# Patient Record
Sex: MALE | Race: WHITE | NOT HISPANIC OR LATINO | Employment: FULL TIME | URBAN - METROPOLITAN AREA
[De-identification: names, ages, dates, MRNs, and addresses within clinical notes are randomized per-mention and may not be internally consistent; named-entity substitution may affect disease eponyms.]

---

## 2023-08-18 ENCOUNTER — TELEPHONE (OUTPATIENT)
Dept: PRIMARY CARE | Facility: CLINIC | Age: 63
End: 2023-08-18

## 2023-08-23 DIAGNOSIS — F90.2 ATTENTION DEFICIT HYPERACTIVITY DISORDER (ADHD), COMBINED TYPE: Primary | ICD-10-CM

## 2023-08-23 RX ORDER — DEXTROAMPHETAMINE SACCHARATE, AMPHETAMINE ASPARTATE, DEXTROAMPHETAMINE SULFATE AND AMPHETAMINE SULFATE 5; 5; 5; 5 MG/1; MG/1; MG/1; MG/1
20 TABLET ORAL 3 TIMES DAILY
Qty: 90 TABLET | Refills: 0 | Status: SHIPPED | OUTPATIENT
Start: 2023-08-23 | End: 2023-08-29 | Stop reason: SDUPTHER

## 2023-08-29 ENCOUNTER — OFFICE VISIT (OUTPATIENT)
Dept: PRIMARY CARE | Facility: CLINIC | Age: 63
End: 2023-08-29
Payer: COMMERCIAL

## 2023-08-29 VITALS
TEMPERATURE: 98 F | WEIGHT: 193.6 LBS | SYSTOLIC BLOOD PRESSURE: 120 MMHG | BODY MASS INDEX: 28.18 KG/M2 | DIASTOLIC BLOOD PRESSURE: 80 MMHG

## 2023-08-29 DIAGNOSIS — F90.9 ATTENTION DEFICIT HYPERACTIVITY DISORDER (ADHD), UNSPECIFIED ADHD TYPE: ICD-10-CM

## 2023-08-29 PROBLEM — R39.11 URINARY HESITANCY: Status: ACTIVE | Noted: 2023-08-29

## 2023-08-29 PROBLEM — I25.10 CORONARY ARTERIOSCLEROSIS: Status: ACTIVE | Noted: 2023-08-29

## 2023-08-29 PROBLEM — E78.49 FAMILIAL COMBINED HYPERLIPIDEMIA: Status: ACTIVE | Noted: 2023-08-29

## 2023-08-29 PROBLEM — F98.8 ATTENTION DEFICIT DISORDER (ADD): Status: ACTIVE | Noted: 2023-08-29

## 2023-08-29 PROBLEM — R73.01 ELEVATED FASTING BLOOD SUGAR: Status: ACTIVE | Noted: 2023-08-29

## 2023-08-29 LAB
AMPHETAMINE (PRESENCE) IN URINE BY SCREEN METHOD: ABNORMAL
BARBITURATES PRESENCE IN URINE BY SCREEN METHOD: ABNORMAL
BENZODIAZEPINE (PRESENCE) IN URINE BY SCREEN METHOD: ABNORMAL
CANNABINOIDS IN URINE BY SCREEN METHOD: ABNORMAL
COCAINE (PRESENCE) IN URINE BY SCREEN METHOD: ABNORMAL
DRUG SCREEN COMMENT URINE: ABNORMAL
FENTANYL URINE: ABNORMAL
OPIATES (PRESENCE) IN URINE BY SCREEN METHOD: ABNORMAL
OXYCODONE (PRESENCE) IN URINE BY SCREEN METHOD: ABNORMAL
PHENCYCLIDINE (PRESENCE) IN URINE BY SCREEN METHOD: ABNORMAL

## 2023-08-29 PROCEDURE — 90471 IMMUNIZATION ADMIN: CPT | Performed by: NURSE PRACTITIONER

## 2023-08-29 PROCEDURE — 80324 DRUG SCREEN AMPHETAMINES 1/2: CPT

## 2023-08-29 PROCEDURE — 80307 DRUG TEST PRSMV CHEM ANLYZR: CPT

## 2023-08-29 PROCEDURE — 1160F RVW MEDS BY RX/DR IN RCRD: CPT | Performed by: NURSE PRACTITIONER

## 2023-08-29 PROCEDURE — 1036F TOBACCO NON-USER: CPT | Performed by: NURSE PRACTITIONER

## 2023-08-29 PROCEDURE — 1159F MED LIST DOCD IN RCRD: CPT | Performed by: NURSE PRACTITIONER

## 2023-08-29 PROCEDURE — 99214 OFFICE O/P EST MOD 30 MIN: CPT | Performed by: NURSE PRACTITIONER

## 2023-08-29 PROCEDURE — 90677 PCV20 VACCINE IM: CPT | Performed by: NURSE PRACTITIONER

## 2023-08-29 RX ORDER — DEXTROAMPHETAMINE SACCHARATE, AMPHETAMINE ASPARTATE, DEXTROAMPHETAMINE SULFATE AND AMPHETAMINE SULFATE 5; 5; 5; 5 MG/1; MG/1; MG/1; MG/1
20 TABLET ORAL 3 TIMES DAILY
Qty: 90 TABLET | Refills: 0 | Status: SHIPPED | OUTPATIENT
Start: 2023-08-29 | End: 2024-02-13 | Stop reason: SDUPTHER

## 2023-08-29 RX ORDER — ATORVASTATIN CALCIUM 40 MG/1
40 TABLET, FILM COATED ORAL NIGHTLY
COMMUNITY
End: 2024-02-14 | Stop reason: SDUPTHER

## 2023-08-29 ASSESSMENT — PATIENT HEALTH QUESTIONNAIRE - PHQ9
SUM OF ALL RESPONSES TO PHQ9 QUESTIONS 1 AND 2: 0
1. LITTLE INTEREST OR PLEASURE IN DOING THINGS: NOT AT ALL
2. FEELING DOWN, DEPRESSED OR HOPELESS: NOT AT ALL

## 2023-08-29 ASSESSMENT — ENCOUNTER SYMPTOMS
CARDIOVASCULAR NEGATIVE: 1
RESPIRATORY NEGATIVE: 1
PSYCHIATRIC NEGATIVE: 1
CONSTITUTIONAL NEGATIVE: 1

## 2023-08-29 NOTE — PROGRESS NOTES
Subjective   Patient ID: Asim Jeffries is a 63 y.o. male who presents for Med Refill (Adderall).    HPI Presents today for follow up and medication renewal.  Tolerates medication well.  Is now 63 so due for Prevnar  Scheduled for colonsocpy 9/2023  Review of Systems   Constitutional: Negative.    Respiratory: Negative.     Cardiovascular: Negative.    Psychiatric/Behavioral: Negative.         Objective   /80 (BP Location: Left arm, Patient Position: Sitting)   Temp 36.7 °C (98 °F) (Temporal)   Wt 87.8 kg (193 lb 9.6 oz)   BMI 28.18 kg/m²     Physical Exam  Constitutional:       Appearance: Normal appearance.   Cardiovascular:      Rate and Rhythm: Normal rate and regular rhythm.   Pulmonary:      Effort: Pulmonary effort is normal.      Breath sounds: Normal breath sounds.   Musculoskeletal:         General: Normal range of motion.   Neurological:      General: No focal deficit present.      Mental Status: He is alert.   Psychiatric:         Mood and Affect: Mood normal.         Behavior: Behavior normal.         Assessment/Plan   Problem List Items Addressed This Visit       Attention deficit disorder (ADD)    Relevant Medications    amphetamine-dextroamphetamine (Adderall) 20 mg tablet    amphetamine-dextroamphetamine (Adderall) 20 mg tablet    amphetamine-dextroamphetamine (Adderall) 20 mg tablet    Other Relevant Orders    Amphetamine Confirm, Urine    Drug Screen, Urine With Reflex to Confirmation        OARRS:  No data recorded  I have personally reviewed the OARRS report for Paramjit Jeffries. I have considered the risks of abuse, dependence, addiction and diversion    Is the patient prescribed a combination of a benzodiazepine and opioid?  No    Last Urine Drug Screen / ordered today: Yes  No results found for this or any previous visit (from the past 8760 hour(s)).  N/A  Clinical rationale for not completing a Urine Drug Screen: yes    Controlled Substance Agreement:  Date of the Last Agreement:  08/29/2023  Reviewed Controlled Substance Agreement including but not limited to the benefits, risks, and alternatives to treatment with a Controlled Substance medication(s).    Stimulants:   What is the patient's goal of therapy? Better focus  Is this being achieved with current treatment? yes    Activities of Daily Living:   Is your overall impression that this patient is benefiting (symptom reduction outweighs side effects) from stimulant therapy? Yes     1. Physical Functioning: Better  2. Family Relationship: Better  3. Social Relationship: Better  4. Mood: Better  5. Sleep Patterns: Better  6. Overall Function: Better

## 2023-09-02 LAB
AMPHETAMINES,URINE: >5000 NG/ML
MDA,URINE: <200 NG/ML
MDEA,URINE: <200 NG/ML
MDMA,UR: <200 NG/ML
METHAMPHETAMINE QUANTITATIVE URINE: <200 NG/ML
PHENTERMINE,UR: <200 NG/ML

## 2023-10-01 PROBLEM — E66.3 OVERWEIGHT WITH BODY MASS INDEX (BMI) OF 28 TO 28.9 IN ADULT: Status: ACTIVE | Noted: 2023-10-01

## 2023-10-01 PROBLEM — M25.512 LEFT SHOULDER PAIN: Status: ACTIVE | Noted: 2023-10-01

## 2023-10-01 PROBLEM — R06.02 SHORTNESS OF BREATH ON EXERTION: Status: ACTIVE | Noted: 2023-10-01

## 2023-10-01 PROBLEM — K63.5 COLON POLYPS: Status: ACTIVE | Noted: 2023-10-01

## 2023-10-01 RX ORDER — POLYETHYLENE GLYCOL 3350, SODIUM CHLORIDE, SODIUM BICARBONATE, POTASSIUM CHLORIDE 420; 11.2; 5.72; 1.48 G/4L; G/4L; G/4L; G/4L
POWDER, FOR SOLUTION ORAL
COMMUNITY
Start: 2023-03-08 | End: 2024-02-12 | Stop reason: ALTCHOICE

## 2023-10-03 ENCOUNTER — OFFICE VISIT (OUTPATIENT)
Dept: ORTHOPEDIC SURGERY | Facility: HOSPITAL | Age: 63
End: 2023-10-03
Payer: COMMERCIAL

## 2023-10-03 DIAGNOSIS — M75.82 TENDINITIS OF BOTH ROTATOR CUFFS: Primary | ICD-10-CM

## 2023-10-03 DIAGNOSIS — M75.81 TENDINITIS OF BOTH ROTATOR CUFFS: Primary | ICD-10-CM

## 2023-10-03 PROCEDURE — 99213 OFFICE O/P EST LOW 20 MIN: CPT | Performed by: ORTHOPAEDIC SURGERY

## 2023-10-03 PROCEDURE — 1160F RVW MEDS BY RX/DR IN RCRD: CPT | Performed by: ORTHOPAEDIC SURGERY

## 2023-10-03 PROCEDURE — 1159F MED LIST DOCD IN RCRD: CPT | Performed by: ORTHOPAEDIC SURGERY

## 2023-10-03 PROCEDURE — 1036F TOBACCO NON-USER: CPT | Performed by: ORTHOPAEDIC SURGERY

## 2023-10-03 PROCEDURE — 20610 DRAIN/INJ JOINT/BURSA W/O US: CPT | Mod: RT | Performed by: ORTHOPAEDIC SURGERY

## 2023-10-03 PROCEDURE — 20610 DRAIN/INJ JOINT/BURSA W/O US: CPT | Mod: LT | Performed by: ORTHOPAEDIC SURGERY

## 2023-10-03 PROCEDURE — 99213 OFFICE O/P EST LOW 20 MIN: CPT | Mod: 25 | Performed by: ORTHOPAEDIC SURGERY

## 2023-10-03 NOTE — PROGRESS NOTES
Patient ID: Asim Jeffries is a 63 y.o. male.    L Inj/Asp: L subacromial bursa on 10/3/2023 9:20 AM  Indications: pain  Details: 20 G needle, anterior approach  Outcome: tolerated well, no immediate complications  Procedure, treatment alternatives, risks and benefits explained, specific risks discussed. Consent was given by the patient. Immediately prior to procedure a time out was called to verify the correct patient, procedure, equipment, support staff and site/side marked as required. Patient was prepped and draped in the usual sterile fashion.       L Inj/Asp: R subacromial bursa on 10/3/2023 9:21 AM  Indications: pain  Details: 20 G needle  Outcome: tolerated well, no immediate complications  Procedure, treatment alternatives, risks and benefits explained, specific risks discussed. Consent was given by the patient. Immediately prior to procedure a time out was called to verify the correct patient, procedure, equipment, support staff and site/side marked as required. Patient was prepped and draped in the usual sterile fashion.           Subjective    Patient ID: Asim Jeffries is a 63 y.o. male.    Chief Complaint: No chief complaint on file.     Last Surgery: No surgery found  Last Surgery Date: No surgery found    Paramjit is a 63 y.o. old male returning today for evaluation of his bilateral shoulders.     He reports that injections have been working. He is hoping for more today. He would also like to discuss surgery for later in the new year after he returns from vacation. He is currently using ibuprofen and CBD to help with inflammation.         Objective   Right Shoulder Exam     Range of Motion   Active abduction:  160   External rotation:  70   Forward flexion:  160   Internal rotation 0 degrees:  Mid thoracic     Muscle Strength   Abduction: 5/5   Internal rotation: 5/5   External rotation: 5/5   Supraspinatus: 5/5   Subscapularis: 5/5   Biceps: 5/5       Left Shoulder Exam     Tenderness   The patient is  experiencing tenderness in the biceps tendon (subscapularis).    Range of Motion   Forward flexion:  170   Left shoulder internal rotation 0 degrees: cannot internally rotate.     Muscle Strength   Abduction: 5/5   Internal rotation: 0/5   External rotation: 5/5   Supraspinatus: 5/5   Subscapularis: 5/5   Biceps: 5/5     Tests   Tello test: positive           MRI reviewed by me personally showed full thickness tear of bilateral rotator cuff, L > R.      Assessment/Plan   Encounter Diagnoses:  L > R rotator cuff tears with symptoms related to that    Orders Placed This Encounter    L Inj/Asp    L Inj/Asp     We did bilateral injections for him today. He tolerated these well. We discussed surgery more at length today. I believe that his rotator cuff can be repaired. He would like to followup with Daphne in December for further discussion after his trip in February.     Fu with Daphne in december for surgery discussion     PLAN for left arthroscopic rotator cuff repair decompression, debridement and possible biceps tenodesis    Scribe Attestation  By signing my name below, I, Lea Kaufman   attest that this documentation has been prepared under the direction and in the presence of Paramjit Rolle MD.

## 2023-10-03 NOTE — LETTER
October 3, 2023     Letitia Brown, APRN-CNP  [address]  [facility]  [facility] [address]    Patient: Asim Jeffries   YOB: 1960   Date of Visit: 10/3/2023       Dear Dr. Letitia Brown, APRN-CNP:    Thank you for referring Asim Jeffries to me for evaluation. Below are my notes for this consultation.  If you have questions, please do not hesitate to call me. I look forward to following your patient along with you.       Sincerely,     Paramjit Rolle MD      CC: No Recipients  ______________________________________________________________________________________    Patient ID: Asim Jeffries is a 63 y.o. male.    L Inj/Asp: L subacromial bursa on 10/3/2023 9:20 AM  Indications: pain  Details: 20 G needle, anterior approach  Outcome: tolerated well, no immediate complications  Procedure, treatment alternatives, risks and benefits explained, specific risks discussed. Consent was given by the patient. Immediately prior to procedure a time out was called to verify the correct patient, procedure, equipment, support staff and site/side marked as required. Patient was prepped and draped in the usual sterile fashion.       L Inj/Asp: R subacromial bursa on 10/3/2023 9:21 AM  Indications: pain  Details: 20 G needle  Outcome: tolerated well, no immediate complications  Procedure, treatment alternatives, risks and benefits explained, specific risks discussed. Consent was given by the patient. Immediately prior to procedure a time out was called to verify the correct patient, procedure, equipment, support staff and site/side marked as required. Patient was prepped and draped in the usual sterile fashion.           Subjective   Patient ID: Asim Jeffries is a 63 y.o. male.    Chief Complaint: No chief complaint on file.     Last Surgery: No surgery found  Last Surgery Date: No surgery found    Paramjit is a 63 y.o. old male returning today for evaluation of his bilateral shoulders.     He reports that  injections have been working. He is hoping for more today. He would also like to discuss surgery for later in the new year after he returns from vacation. He is currently using ibuprofen and CBD to help with inflammation.         Objective  Right Shoulder Exam     Range of Motion   Active abduction:  160   External rotation:  70   Forward flexion:  160   Internal rotation 0 degrees:  Mid thoracic     Muscle Strength   Abduction: 5/5   Internal rotation: 5/5   External rotation: 5/5   Supraspinatus: 5/5   Subscapularis: 5/5   Biceps: 5/5       Left Shoulder Exam     Tenderness   The patient is experiencing tenderness in the biceps tendon (subscapularis).    Range of Motion   Forward flexion:  170   Left shoulder internal rotation 0 degrees: cannot internally rotate.     Muscle Strength   Abduction: 5/5   Internal rotation: 0/5   External rotation: 5/5   Supraspinatus: 5/5   Subscapularis: 5/5   Biceps: 5/5     Tests   Tello test: positive           MRI reviewed by me personally showed full thickness tear of bilateral rotator cuff, L > R.      Assessment/Plan  Encounter Diagnoses:  L > R rotator cuff tears with symptoms related to that    Orders Placed This Encounter   • L Inj/Asp   • L Inj/Asp     We did bilateral injections for him today. He tolerated these well. We discussed surgery more at length today. I believe that his rotator cuff can be repaired. He would like to followup with Daphne in December for further discussion after his trip in February.     Fu with Daphne in december for surgery discussion     PLAN for left arthroscopic rotator cuff repair decompression, debridement and possible biceps tenodesis    Scribe Attestation  By signing my name below, IMaria Guadalupe Scribe   attest that this documentation has been prepared under the direction and in the presence of Paramjit Rolle MD.

## 2023-12-12 ENCOUNTER — OFFICE VISIT (OUTPATIENT)
Dept: ORTHOPEDIC SURGERY | Facility: HOSPITAL | Age: 63
End: 2023-12-12
Payer: COMMERCIAL

## 2023-12-12 VITALS — HEIGHT: 70 IN | WEIGHT: 184 LBS | BODY MASS INDEX: 26.34 KG/M2

## 2023-12-12 DIAGNOSIS — M75.122 COMPLETE TEAR OF LEFT ROTATOR CUFF, UNSPECIFIED WHETHER TRAUMATIC: Primary | ICD-10-CM

## 2023-12-12 DIAGNOSIS — Z01.818 PREOP EXAMINATION: ICD-10-CM

## 2023-12-12 PROBLEM — Z98.890 S/P LEFT ROTATOR CUFF REPAIR: Status: ACTIVE | Noted: 2023-12-12

## 2023-12-12 PROCEDURE — 1159F MED LIST DOCD IN RCRD: CPT | Performed by: NURSE PRACTITIONER

## 2023-12-12 PROCEDURE — 1125F AMNT PAIN NOTED PAIN PRSNT: CPT | Performed by: NURSE PRACTITIONER

## 2023-12-12 PROCEDURE — L3670 SO ACRO/CLAV CAN WEB PRE OTS: HCPCS | Performed by: NURSE PRACTITIONER

## 2023-12-12 PROCEDURE — 99214 OFFICE O/P EST MOD 30 MIN: CPT | Performed by: NURSE PRACTITIONER

## 2023-12-12 PROCEDURE — 1160F RVW MEDS BY RX/DR IN RCRD: CPT | Performed by: NURSE PRACTITIONER

## 2023-12-12 PROCEDURE — 1036F TOBACCO NON-USER: CPT | Performed by: NURSE PRACTITIONER

## 2023-12-12 ASSESSMENT — PAIN - FUNCTIONAL ASSESSMENT: PAIN_FUNCTIONAL_ASSESSMENT: 0-10

## 2023-12-12 ASSESSMENT — PAIN SCALES - GENERAL: PAINLEVEL_OUTOF10: 3

## 2023-12-12 NOTE — PROGRESS NOTES
Provider Impression/Assessment:  Patient with chronic bilateral rotator cuff tear L > R. Confirmed with recent MRI and clinical evaluation.       Patient Discussion/Plan:  They have failed conservative management of anti-inflammatories, physical therapy and injections. The risks, benefits and alternatives of shoulder surgery were discussed at length with Dr Rolle previously. These were reviewed today. The risks of surgery are infection, dislocation, nerve injury, blood loss. The benefits are pain relief and restoration of function. The patient verbalize an understanding of the risks, benefits, alternatives and the expected outcomes and wishes to proceed with elective shoulder surgery at this time. The rehabilitation after surgery was discussed at length today. Rehabilitation after rotator cuff repair lasting a total of 5-6 months after surgery. Lifting is slowly progressed. They will be in a sling for the first month. The following 2-3 months would work on range of motion. No strengthening until 3 months from surgery.     PLAN is for LEFT arthroscopic rotator cuff repair with subscapularis repair, decompression and debridement with possible biceps tenodesis     At this point we will plan for surgery on 2/22/24 at [facility]. E-consent signed by patient today. We gave the patient a handout today on arthroscopic rotator cuff repair. This will be out-patient surgery. They were fitted for the post operative sling today. They will need to be seen and cleared by the Anesthesia team prior to surgery date.     All patient's questions were answered today to their satisfaction and they are in agreement with the above plan. They know how to reach the office if there are any additional questions prior to surgery date. Patient was discussed with Dr Rolle and he is in agreement with the plan.     Should you have any questions or concerns after your visit today, please do not hesitate to call the office at  [phone]. I am honored to be a provider on your health care team and remain dedicated to helping you achieve your healthcare goals  -------------------------------------------------------------------------------------------------  CHIEF COMPLAINT:  FUV - LEFT SHOULDER  Pre-Op Evaluation    History of Present Illness:  The patient is a pleasant 63 y.o. right hand dominant male returning today for evaluation of their left shoulder. He owns his own business and has already considered the time off that he will need following shoulder surgery in February, after his planned ski trip.     8/31/23  He is here today with a 1 yearr history of achy pain in his bilateral shoulders. He notes that it flares up during activities such as golfing and racket sports. It will wake him up from sleep while it is flared up. He cannot recall any trauma. he has been trying dry needling and a TENS unit that helps somewhat. He has not done physical therapy or tried injections. His left is functionally worse     12/12/23  MEHDI NICK returns to clinic for a preoperative planning visit. Plan for left arthroscopic rotator cuff repair with subscapularis repair, decompression and debridement with possible biceps tenodesis. He would like to schedule the surgery for late February of 2024. He denies any new injury to the shoulder since his last visit. He has a cold therapy pack and will call office if he needs a new shoulder pad.     Review of Systems:   Review of systems for all 14 systems is negative for complaint except for the above noted findings in the history of present illness.    Family, social, and medical histories are obtained and reviewed.    Smoking Status: None smoker  Diabetic: Denies  Thyroid Disorder: Denies  BMI: 26    Allergies:  No Known Allergies    Past Medical History:   Diagnosis Date    Other conditions influencing health status 04/02/2013    Reported Family History Of Ischemic Heart Disease    Other conditions influencing  health status 04/02/2013    Adverse Effect Of Antilipemics    Personal history of colonic polyps     History of colonic polyps    Unspecified cataract 04/02/2013    Cataract       Past Surgical History:   Procedure Laterality Date    CATARACT EXTRACTION  01/29/2014    Cataract Surgery    CHOLECYSTECTOMY  01/29/2014    Cholecystectomy    COLONOSCOPY  01/29/2014    Complete Colonoscopy    MOUTH SURGERY  01/29/2014    Oral Surgery Tooth Extraction    TONSILLECTOMY  01/29/2014    Tonsillectomy       Social History     Socioeconomic History    Marital status:      Spouse name: Not on file    Number of children: Not on file    Years of education: Not on file    Highest education level: Not on file   Occupational History    Not on file   Tobacco Use    Smoking status: Never    Smokeless tobacco: Never   Vaping Use    Vaping Use: Never used   Substance and Sexual Activity    Alcohol use: Yes    Drug use: Never    Sexual activity: Not on file   Other Topics Concern    Not on file   Social History Narrative    Not on file     Social Determinants of Health     Financial Resource Strain: Not on file   Food Insecurity: Not on file   Transportation Needs: Not on file   Physical Activity: Not on file   Stress: Not on file   Social Connections: Not on file   Intimate Partner Violence: Not on file   Housing Stability: Not on file     Diagnoses/Problems:  Pre-operative evaluation  Left shoulder rotator cuff tear    Physical Examination:  Patient is a well-developed, well-nourished male in no acute distress. Breathes with normal chest rises. Pupils are round and symmetric today. Awake, alert, and oriented x3.      Patient had 5 out of 5 wrist flexors extension, thumb extension, bilaterally. Sensation was intact to light touch to median, ulnar, radial, axillary, and musculocutaneous nerves bilaterally. Positive radial pulse bilaterally.    Examination of the right shoulder today reveals the skin to be intact. There is no sign  any atrophy lesions or abrasions. There is no pain to palpation of the bony prominences. Provocative maneuvers on the right side today were negative.   Range of motion of the right shoulder revealed 0-170° of forward elevation. 0-60° of external rotation. And internal rotation up to T12     Examination of the left shoulder today reveals the skin to be intact. There is no sign any atrophy lesions or abrasions. There is no pain to palpation of the bony prominences. Range of motion of the left shoulder revealed 150° of forward elevation. 60° of external rotation. Internal rotation was to T12. External resistance 2/5. Internal resistance 3/5. Jobes 3/5.      Results/Data  08/31/23 MRI personally reviewed by Dr Rolle shows left full thickness tear of the subscapularis retracted to level of the glenohumeral joint, no signs of significant fatty degeneration. This extends to superior rotator cuff as well. Personally reviewed with patient today.               MEDICAL EQUIPMENT:  Patient was prescribed a shoulder sling for postoperative care. The patient will have weakness, instability and/or deformity of their left arm which requires stabilization from this orthosis to improve their function after surgery.     Verbal and written instructions for the use, wear schedule, cleaning and application of this item were given. Patient was instructed that should the brace result in increased pain, decreased sensation, increased swelling, or an overall worsening of their medical condition, to please contact our office immediately at [phone].     Orthotic management and training was provided for skin care, modifications due to healing tissues, edema changes, interruption in skin integrity, and safety precautions with the orthosis.      *While intending to generate a timely document that accurately reflects the content of the visit, no guarantee can be provided that every grammatical or spelling mistake has been or will be  identified or corrected. Thank you for your understanding.

## 2024-02-07 ENCOUNTER — APPOINTMENT (OUTPATIENT)
Dept: PREADMISSION TESTING | Facility: HOSPITAL | Age: 64
End: 2024-02-07
Payer: COMMERCIAL

## 2024-02-12 ENCOUNTER — PRE-ADMISSION TESTING (OUTPATIENT)
Dept: PREADMISSION TESTING | Facility: HOSPITAL | Age: 64
End: 2024-02-12
Payer: COMMERCIAL

## 2024-02-12 ENCOUNTER — LAB (OUTPATIENT)
Dept: LAB | Facility: LAB | Age: 64
End: 2024-02-12
Payer: COMMERCIAL

## 2024-02-12 VITALS
HEIGHT: 70 IN | HEART RATE: 80 BPM | BODY MASS INDEX: 28 KG/M2 | RESPIRATION RATE: 16 BRPM | TEMPERATURE: 96.8 F | DIASTOLIC BLOOD PRESSURE: 86 MMHG | OXYGEN SATURATION: 100 % | SYSTOLIC BLOOD PRESSURE: 147 MMHG | WEIGHT: 195.55 LBS

## 2024-02-12 DIAGNOSIS — Z01.818 PRE-OP EXAM: ICD-10-CM

## 2024-02-12 DIAGNOSIS — Z01.818 PRE-OP EXAM: Primary | ICD-10-CM

## 2024-02-12 LAB
ANION GAP SERPL CALC-SCNC: 11 MMOL/L
APPEARANCE UR: CLEAR
BILIRUB UR STRIP.AUTO-MCNC: NEGATIVE MG/DL
BUN SERPL-MCNC: 14 MG/DL (ref 8–25)
CALCIUM SERPL-MCNC: 9.1 MG/DL (ref 8.5–10.4)
CHLORIDE SERPL-SCNC: 101 MMOL/L (ref 97–107)
CO2 SERPL-SCNC: 25 MMOL/L (ref 24–31)
COLOR UR: COLORLESS
CREAT SERPL-MCNC: 0.8 MG/DL (ref 0.4–1.6)
EGFRCR SERPLBLD CKD-EPI 2021: >90 ML/MIN/1.73M*2
ERYTHROCYTE [DISTWIDTH] IN BLOOD BY AUTOMATED COUNT: 13.2 % (ref 11.5–14.5)
GLUCOSE SERPL-MCNC: 99 MG/DL (ref 65–99)
GLUCOSE UR STRIP.AUTO-MCNC: NORMAL MG/DL
HCT VFR BLD AUTO: 44.8 % (ref 41–52)
HGB BLD-MCNC: 14.8 G/DL (ref 13.5–17.5)
KETONES UR STRIP.AUTO-MCNC: NEGATIVE MG/DL
LEUKOCYTE ESTERASE UR QL STRIP.AUTO: NEGATIVE
MCH RBC QN AUTO: 30.3 PG (ref 26–34)
MCHC RBC AUTO-ENTMCNC: 33 G/DL (ref 32–36)
MCV RBC AUTO: 92 FL (ref 80–100)
NITRITE UR QL STRIP.AUTO: NEGATIVE
NRBC BLD-RTO: 0 /100 WBCS (ref 0–0)
PH UR STRIP.AUTO: 5 [PH]
PLATELET # BLD AUTO: 295 X10*3/UL (ref 150–450)
POTASSIUM SERPL-SCNC: 4.5 MMOL/L (ref 3.4–5.1)
PROT UR STRIP.AUTO-MCNC: NEGATIVE MG/DL
RBC # BLD AUTO: 4.88 X10*6/UL (ref 4.5–5.9)
RBC # UR STRIP.AUTO: NEGATIVE /UL
SODIUM SERPL-SCNC: 137 MMOL/L (ref 133–145)
SP GR UR STRIP.AUTO: 1.01
UROBILINOGEN UR STRIP.AUTO-MCNC: NORMAL MG/DL
WBC # BLD AUTO: 10.5 X10*3/UL (ref 4.4–11.3)

## 2024-02-12 PROCEDURE — 80048 BASIC METABOLIC PNL TOTAL CA: CPT

## 2024-02-12 PROCEDURE — 87081 CULTURE SCREEN ONLY: CPT | Mod: WESLAB

## 2024-02-12 PROCEDURE — 81003 URINALYSIS AUTO W/O SCOPE: CPT

## 2024-02-12 PROCEDURE — 36415 COLL VENOUS BLD VENIPUNCTURE: CPT

## 2024-02-12 PROCEDURE — 85027 COMPLETE CBC AUTOMATED: CPT

## 2024-02-12 RX ORDER — CHLORHEXIDINE GLUCONATE ORAL RINSE 1.2 MG/ML
SOLUTION DENTAL
Qty: 473 ML | Refills: 0 | Status: SHIPPED | OUTPATIENT
Start: 2024-02-21 | End: 2024-02-13 | Stop reason: WASHOUT

## 2024-02-12 ASSESSMENT — DUKE ACTIVITY SCORE INDEX (DASI)
CAN YOU DO LIGHT WORK AROUND THE HOUSE LIKE DUSTING OR WASHING DISHES: YES
CAN YOU DO MODERATE WORK AROUND THE HOUSE LIKE VACUUMING, SWEEPING FLOORS OR CARRYING GROCERIES: YES
CAN YOU PARTICIPATE IN STRENOUS SPORTS LIKE SWIMMING, SINGLES TENNIS, FOOTBALL, BASKETBALL, OR SKIING: YES
CAN YOU RUN A SHORT DISTANCE: YES
TOTAL_SCORE: 58.2
CAN YOU CLIMB A FLIGHT OF STAIRS OR WALK UP A HILL: YES
CAN YOU PARTICIPATE IN MODERATE RECREATIONAL ACTIVITIES LIKE GOLF, BOWLING, DANCING, DOUBLES TENNIS OR THROWING A BASEBALL OR FOOTBALL: YES
CAN YOU TAKE CARE OF YOURSELF (EAT, DRESS, BATHE, OR USE TOILET): YES
CAN YOU WALK INDOORS, SUCH AS AROUND YOUR HOUSE: YES
CAN YOU HAVE SEXUAL RELATIONS: YES
CAN YOU DO HEAVY WORK AROUND THE HOUSE LIKE SCRUBBING FLOORS OR LIFTING AND MOVING HEAVY FURNITURE: YES
CAN YOU DO YARD WORK LIKE RAKING LEAVES, WEEDING OR PUSHING A MOWER: YES
CAN YOU WALK A BLOCK OR TWO ON LEVEL GROUND: YES
DASI METS SCORE: 9.9

## 2024-02-12 ASSESSMENT — PAIN - FUNCTIONAL ASSESSMENT: PAIN_FUNCTIONAL_ASSESSMENT: 0-10

## 2024-02-12 ASSESSMENT — CHADS2 SCORE
HYPERTENSION: NO
DIABETES: NO
AGE GREATER THAN OR EQUAL TO 75: NO
CHADS2 SCORE: 0
CHF: NO
PRIOR STROKE OR TIA OR THROMBOEMBOLISM: NO

## 2024-02-12 ASSESSMENT — LIFESTYLE VARIABLES: SMOKING_STATUS: NONSMOKER

## 2024-02-12 ASSESSMENT — ENCOUNTER SYMPTOMS: ARTHRALGIAS: 1

## 2024-02-12 ASSESSMENT — PAIN SCALES - GENERAL: PAINLEVEL_OUTOF10: 0 - NO PAIN

## 2024-02-12 NOTE — PREPROCEDURE INSTRUCTIONS
Medication List            Accurate as of February 12, 2024  1:45 PM. Always use your most recent med list.                * amphetamine-dextroamphetamine 20 mg tablet  Commonly known as: Adderall  Take 1 tablet (20 mg) by mouth 3 times a day. Do not fill until 9/28/2023  Medication Adjustments for Surgery: Continue until night before surgery  Notes to patient: Hold morning of surgery     * amphetamine-dextroamphetamine 20 mg tablet  Commonly known as: Adderall  Take 1 tablet (20 mg) by mouth 3 times a day.     * amphetamine-dextroamphetamine 20 mg tablet  Commonly known as: Adderall  Take 1 tablet (20 mg) by mouth 3 times a day. Do not fill until 10/28/2023     atorvastatin 40 mg tablet  Commonly known as: Lipitor  Medication Adjustments for Surgery: Take morning of surgery with sip of water, no other fluids     MULTI VITAMIN ORAL  Medication Adjustments for Surgery: Stop 7 days before surgery           * This list has 3 medication(s) that are the same as other medications prescribed for you. Read the directions carefully, and ask your doctor or other care provider to review them with you.                                  NPO Instructions:    Do not eat any food after midnight the night before your surgery/procedure.    Additional Instructions:     Day of Surgery:  Review your medication instructions, take indicated medications  Wear  comfortable loose fitting clothing  Do not use moisturizers, creams, lotions or perfume  All jewelry and valuables should be left at homePAT DISCHARGE INSTRUCTIONS    Please call the Same Day Surgery (SDS) Department of the hospital where your procedure will be performed after 2:00 PM the day before your surgery. If you are scheduled on a Monday, or a Tuesday following a Monday holiday, you will need to call on the last business day prior to your surgery.    [facility]  [address] [place] [address], [address] [phone]    [place]  [id number] [place] [address] [phone]    [facility]  [address] [place] [address]  [phone]    Please let your surgeon know if:      You develop any open sores, shingles, burning or painful urination as these may increase your risk of an infection.   You no longer wish to have the surgery.   Any other personal circumstances change that may lead to the need to cancel or defer this surgery-such as being sick or getting admitted to any hospital within one week of your planned procedure.    Your contact details change, such as a change of address or phone number.    Starting now:     Please DO NOT drink alcohol or smoke for 24 hours before surgery. It is well known that quitting smoking can make a huge difference to your health and recovery from surgery. The longer you abstain from smoking, the better your chances of a healthy recovery. If you need help with quitting, call 0-849-QUIT-NOW to be connected to a trained counselor who will discuss the best methods to help you quit.     Before your surgery:    Please stop all supplements 7 days prior to surgery. Or as directed by your surgeon.   Please stop taking NSAID pain medicine such as Advil and Motrin 7 days before surgery.    If you develop any fever, cough, cold, rashes, cuts, scratches, scrapes, urinary symptoms or infection anywhere on your body (including teeth and gums) prior to surgery, please call your surgeon’s office as soon as possible. This may require treatment to reduce the chance of cancellation on the day of surgery.    The day before your surgery:   DIET- Do not eat or drink anything after midnight the night before surgery, including mints, candy and gum.   Get a good night’s rest.  Use the special soap for bathing if you have been instructed to use one.    Scheduled surgery times may change and you will be notified if this occurs - please check your  personal voicemail for any updates.     On the morning of surgery:   Wear comfortable, loose fitting clothes which open in the front. Please do not wear moisturizers, creams, lotions, makeup or perfume.    Please bring with you to surgery:   Photo ID and insurance card   Current list of medicines and allergies   Pacemaker/ Defibrillator/Heart stent cards   CPAP machine and mask    Slings/ splints/ crutches   A copy of your complete advanced directive/DHPOA.    Please do NOT bring with you to surgery:   All jewelry and valuables should be left at home.   Prosthetic devices such as contact lenses, hearing aids, dentures, eyelash extensions, hairpins and body piercings must be removed prior to going in to the surgical suite.    After outpatient surgery:   A responsible adult MUST accompany you at the time of discharge and stay with you for 24 hours after your surgery. You may NOT drive yourself home after surgery.    Do not drive, operate machinery, make critical decisions or do activities that require co-ordination or balance until after a night’s sleep.   Do not drink alcoholic beverages for 24 hours.   Instructions for resuming your medications will be provided by your surgeon.    CALL YOUR DOCTOR AFTER SURGERY IF YOU HAVE:     Chills and/or a fever of 101° F or higher.    Redness, swelling, pus or drainage from your surgical wound or a bad smell from the wound.    Lightheadedness, fainting or confusion.    Persistent vomiting (throwing up) and are not able to eat or drink for 12 hours.    Three or more loose, watery bowel movements in 24 hours (diarrhea).   Difficulty or pain while urinating( after non-urological surgery)    Pain and swelling in your legs, especially if it is only on one side.    Difficulty breathing or are breathing faster than normal.    Any new concerning symptoms. Patient Information: Oral/Dental Rinse    What is oral/dental rinse?  It is a mouthwash. It is a way of cleaning the mouth with a  germ killing solution before your surgery. The solution contains chlorhexidine, commonly know as CHG.  It is used inside the mouth to kill bacteria known as Staphylococcus aureus.  Let your doctor know if you are allergic to chlorhexidine.    Why do I need to use CHG oral/dental rinse?  The CHG oral/dental rinse helps to kill bacteria in your mouth known as Staphylococcus aureus. This reduces the risk of infection at the surgical site.    Using your CHG oral/dental rinse.  STEPS: use your CHG oral/dental rinse after you brush your teeth the night before (at bedtime) and the morning of your surgery. Follow the directions on your prescription label.  Use the cap on the container to measure 15ml (fill cap to fill line)  Swish (gargle if you can) the mouthwash in your mouth for at least 30 seconds, (do not swallow) spit out.  After you use your CHG rinse, do not rinse your mouth with water, drink or eat. Please refer to prescription label for the appropriate time to resume oral intake.    What side effects might I have using the CHG oral/dental rinse?  CHG rinse will stick to the plaque on the teeth. Brush and floss just before use. Teeth brushing will help avoid staining of plaque during use.    Who should I contact if I have questions about the CHG oral/dental rinse?  Please call [facility], [facility] at [phone] if you have any questions. Home Preoperative Antibacterial Shower    What is a home preoperative antibacterial shower?  This shower is a way of cleaning the skin with a germ killing solution before surgery. The solution contains chlorhexidine, commonly known as CHG. CHG is a skin cleanser with germ killing ability. Let your doctor know if you are allergic to chlorhexidine.      Why do I need to take a preoperative antibacterial shower?  Skin is not sterile. It is best to try to make your skin as free of germs as possible before surgery. Proper  cleansing with a germ killing soap before surgery can lower the number of germs on your skin. This helps to reduce the risk of infection at the surgical site. Following the instructions listed below will help you prepare your skin for surgery.    How do I use the solution?      Steps: Begin using your CHG soap FIVE DAYS BEFORE your scheduled surgery on __________________________________________________.  First, wash and rinse your hair using the CHG soap. Keep CHG away soap away from ear canals and eyes.  Rinse completely, do not condition. Hair extensions should be removed.  Wash your face with your normal soap and rinse.  Apply the CHG solution to a clean wet washcloth. Turn the water off or move away from the water spray to avoid premature rinsing of the CHG soap as you are applying.  Firmly lather your entire body from neck down. Do not use on face.  Pay special attention to the areas(s) where your incision(s) will be located unless they are on your face.  Avoid scrubbing your skin too hard.  The important point is to have the CHG soap sit on your skin for 3 minutes.  DO NOT wash with regular soap after you have used the CHG soap solution.  Pat yourself dry with a clean, freshly laundered towel.  DO NOT apply powders, deodorants or lotions.  Dress in clean, freshly laundered night clothes.  Be sure to sleep with clean, freshly laundered sheets.  Be aware that CHG will cause stains on fabrics; if you wash them with bleach after use. Rinse your washcloth and other linens that have contact with CHG completely. Use only non-chlorine detergents to launder the items used.  The morning of surgery is the fifth day. Repeat the above steps and dress in clean comfortable clothing.      Who should I call if I have any questions regarding the use of CHG soap?  Call the [facility] at [phone] if you have any questions. Home Preoperative Antibacterial  Shower    What is a home preoperative antibacterial shower?  This shower is a way of cleaning the skin with a germ killing solution before surgery. The solution contains chlorhexidine, commonly known as CHG. CHG is a skin cleanser with germ killing ability. Let your doctor know if you are allergic to chlorhexidine.      Why do I need to take a preoperative antibacterial shower?  Skin is not sterile. It is best to try to make your skin as free of germs as possible before surgery. Proper cleansing with a germ killing soap before surgery can lower the number of germs on your skin. This helps to reduce the risk of infection at the surgical site. Following the instructions listed below will help you prepare your skin for surgery.    How do I use the solution?      Steps: Begin using your CHG soap FIVE DAYS BEFORE your scheduled surgery on __________________________________________________.  First, wash and rinse your hair using the CHG soap. Keep CHG away soap away from ear canals and eyes.  Rinse completely, do not condition. Hair extensions should be removed.  Wash your face with your normal soap and rinse.  Apply the CHG solution to a clean wet washcloth. Turn the water off or move away from the water spray to avoid premature rinsing of the CHG soap as you are applying.  Firmly lather your entire body from neck down. Do not use on face.  Pay special attention to the areas(s) where your incision(s) will be located unless they are on your face.  Avoid scrubbing your skin too hard.  The important point is to have the CHG soap sit on your skin for 3 minutes.  DO NOT wash with regular soap after you have used the CHG soap solution.  Pat yourself dry with a clean, freshly laundered towel.  DO NOT apply powders, deodorants or lotions.  Dress in clean, freshly laundered night clothes.  Be sure to sleep with clean, freshly laundered sheets.  Be aware that CHG will cause stains on fabrics; if you wash them with bleach after  use. Rinse your washcloth and other linens that have contact with CHG completely. Use only non-chlorine detergents to launder the items used.  The morning of surgery is the fifth day. Repeat the above steps and dress in clean comfortable clothing.      Who should I call if I have any questions regarding the use of CHG soap?  Call the [facility] at [phone] if you have any questions.

## 2024-02-12 NOTE — PREPROCEDURE INSTRUCTIONS
Medication List            Accurate as of February 12, 2024  1:38 PM. Always use your most recent med list.                * amphetamine-dextroamphetamine 20 mg tablet  Commonly known as: Adderall  Take 1 tablet (20 mg) by mouth 3 times a day. Do not fill until 9/28/2023  Medication Adjustments for Surgery: Take morning of surgery with sip of water, no other fluids     * amphetamine-dextroamphetamine 20 mg tablet  Commonly known as: Adderall  Take 1 tablet (20 mg) by mouth 3 times a day.     * amphetamine-dextroamphetamine 20 mg tablet  Commonly known as: Adderall  Take 1 tablet (20 mg) by mouth 3 times a day. Do not fill until 10/28/2023     atorvastatin 40 mg tablet  Commonly known as: Lipitor  Medication Adjustments for Surgery: Take morning of surgery with sip of water, no other fluids     MULTI VITAMIN ORAL  Medication Adjustments for Surgery: Stop 7 days before surgery           * This list has 3 medication(s) that are the same as other medications prescribed for you. Read the directions carefully, and ask your doctor or other care provider to review them with you.                                  NPO Instructions:    Do not eat any food after midnight the night before your surgery/procedure.    Additional Instructions:     Day of Surgery:  Review your medication instructions, take indicated medications  Wear  comfortable loose fitting clothing  Do not use moisturizers, creams, lotions or perfume  All jewelry and valuables should be left at homePAT DISCHARGE INSTRUCTIONS    Please call the Same Day Surgery (SDS) Department of the hospital where your procedure will be performed after 2:00 PM the day before your surgery. If you are scheduled on a Monday, or a Tuesday following a Monday holiday, you will need to call on the last business day prior to your surgery.    [address], [address] [phone]    [facility]  [place]  [id number] [phone]    [facility]  [address] [place] [address]  [phone]    Please let your surgeon know if:      You develop any open sores, shingles, burning or painful urination as these may increase your risk of an infection.   You no longer wish to have the surgery.   Any other personal circumstances change that may lead to the need to cancel or defer this surgery-such as being sick or getting admitted to any hospital within one week of your planned procedure.    Your contact details change, such as a change of address or phone number.    Starting now:     Please DO NOT drink alcohol or smoke for 24 hours before surgery. It is well known that quitting smoking can make a huge difference to your health and recovery from surgery. The longer you abstain from smoking, the better your chances of a healthy recovery. If you need help with quitting, call 8-711-QUIT-NOW to be connected to a trained counselor who will discuss the best methods to help you quit.     Before your surgery:    Please stop all supplements 7 days prior to surgery. Or as directed by your surgeon.   Please stop taking NSAID pain medicine such as Advil and Motrin 7 days before surgery.    If you develop any fever, cough, cold, rashes, cuts, scratches, scrapes, urinary symptoms or infection anywhere on your body (including teeth and gums) prior to surgery, please call your surgeon’s office as soon as possible. This may require treatment to reduce the chance of cancellation on the day of surgery.    The day before your surgery:   DIET- Do not eat or drink anything after midnight the night before surgery, including mints, candy and gum.   Get a good night’s rest.  Use the special soap for bathing if you have been instructed to use one.    Scheduled surgery times may change and you will be notified if this occurs - please check your personal voicemail for any  updates.     On the morning of surgery:   Wear comfortable, loose fitting clothes which open in the front. Please do not wear moisturizers, creams, lotions, makeup or perfume.    Please bring with you to surgery:   Photo ID and insurance card   Current list of medicines and allergies   Pacemaker/ Defibrillator/Heart stent cards   CPAP machine and mask    Slings/ splints/ crutches   A copy of your complete advanced directive/DHPOA.    Please do NOT bring with you to surgery:   All jewelry and valuables should be left at home.   Prosthetic devices such as contact lenses, hearing aids, dentures, eyelash extensions, hairpins and body piercings must be removed prior to going in to the surgical suite.    After outpatient surgery:   A responsible adult MUST accompany you at the time of discharge and stay with you for 24 hours after your surgery. You may NOT drive yourself home after surgery.    Do not drive, operate machinery, make critical decisions or do activities that require co-ordination or balance until after a night’s sleep.   Do not drink alcoholic beverages for 24 hours.   Instructions for resuming your medications will be provided by your surgeon.    CALL YOUR DOCTOR AFTER SURGERY IF YOU HAVE:     Chills and/or a fever of 101° F or higher.    Redness, swelling, pus or drainage from your surgical wound or a bad smell from the wound.    Lightheadedness, fainting or confusion.    Persistent vomiting (throwing up) and are not able to eat or drink for 12 hours.    Three or more loose, watery bowel movements in 24 hours (diarrhea).   Difficulty or pain while urinating( after non-urological surgery)    Pain and swelling in your legs, especially if it is only on one side.    Difficulty breathing or are breathing faster than normal.    Any new concerning symptoms. Home Preoperative Antibacterial Shower    What is a home preoperative antibacterial shower?  This shower is a way of cleaning the skin with a germ killing  solution before surgery. The solution contains chlorhexidine, commonly known as CHG. CHG is a skin cleanser with germ killing ability. Let your doctor know if you are allergic to chlorhexidine.      Why do I need to take a preoperative antibacterial shower?  Skin is not sterile. It is best to try to make your skin as free of germs as possible before surgery. Proper cleansing with a germ killing soap before surgery can lower the number of germs on your skin. This helps to reduce the risk of infection at the surgical site. Following the instructions listed below will help you prepare your skin for surgery.    How do I use the solution?      Steps: Begin using your CHG soap FIVE DAYS BEFORE your scheduled surgery on __________________________________________________.  First, wash and rinse your hair using the CHG soap. Keep CHG away soap away from ear canals and eyes.  Rinse completely, do not condition. Hair extensions should be removed.  Wash your face with your normal soap and rinse.  Apply the CHG solution to a clean wet washcloth. Turn the water off or move away from the water spray to avoid premature rinsing of the CHG soap as you are applying.  Firmly lather your entire body from neck down. Do not use on face.  Pay special attention to the areas(s) where your incision(s) will be located unless they are on your face.  Avoid scrubbing your skin too hard.  The important point is to have the CHG soap sit on your skin for 3 minutes.  DO NOT wash with regular soap after you have used the CHG soap solution.  Pat yourself dry with a clean, freshly laundered towel.  DO NOT apply powders, deodorants or lotions.  Dress in clean, freshly laundered night clothes.  Be sure to sleep with clean, freshly laundered sheets.  Be aware that CHG will cause stains on fabrics; if you wash them with bleach after use. Rinse your washcloth and other linens that have contact with CHG completely. Use only non-chlorine detergents to launder  the items used.  The morning of surgery is the fifth day. Repeat the above steps and dress in clean comfortable clothing.      Who should I call if I have any questions regarding the use of CHG soap?  Call the [facility] at [phone] if you have any questions. Patient Information: Oral/Dental Rinse    What is oral/dental rinse?  It is a mouthwash. It is a way of cleaning the mouth with a germ killing solution before your surgery. The solution contains chlorhexidine, commonly know as CHG.  It is used inside the mouth to kill bacteria known as Staphylococcus aureus.  Let your doctor know if you are allergic to chlorhexidine.    Why do I need to use CHG oral/dental rinse?  The CHG oral/dental rinse helps to kill bacteria in your mouth known as Staphylococcus aureus. This reduces the risk of infection at the surgical site.    Using your CHG oral/dental rinse.  STEPS: use your CHG oral/dental rinse after you brush your teeth the night before (at bedtime) and the morning of your surgery. Follow the directions on your prescription label.  Use the cap on the container to measure 15ml (fill cap to fill line)  Swish (gargle if you can) the mouthwash in your mouth for at least 30 seconds, (do not swallow) spit out.  After you use your CHG rinse, do not rinse your mouth with water, drink or eat. Please refer to prescription label for the appropriate time to resume oral intake.    What side effects might I have using the CHG oral/dental rinse?  CHG rinse will stick to the plaque on the teeth. Brush and floss just before use. Teeth brushing will help avoid staining of plaque during use.    Who should I contact if I have questions about the CHG oral/dental rinse?  Please call [facility], [facility] at [phone] if you have any questions.

## 2024-02-12 NOTE — CPM/PAT H&P
"CPM/PAT Evaluation       Name: Paramjit Jeffries (Paramjit Jeffries \"Asim\")  /Age: 1960/63 y.o.     In-Person       Chief Complaint: Left RTC repair    HPI    Past Medical History:   Diagnosis Date    ADD (attention deficit disorder)     Hyperlipidemia     Other conditions influencing health status 2013    Reported Family History Of Ischemic Heart Disease    Other conditions influencing health status 2013    Adverse Effect Of Antilipemics    Personal history of colonic polyps     History of colonic polyps    Unspecified cataract 2013    Cataract       Past Surgical History:   Procedure Laterality Date    CATARACT EXTRACTION  2014    Cataract Surgery    CHOLECYSTECTOMY  2014    Cholecystectomy    COLONOSCOPY  2014    Complete Colonoscopy    MOUTH SURGERY  2014    Oral Surgery Tooth Extraction    TONSILLECTOMY  2014    Tonsillectomy       Patient  has no history on file for sexual activity.    Family History   Problem Relation Name Age of Onset    Alzheimer's disease Mother      Hyperlipidemia Mother      Hypertension Mother      Atrial fibrillation Father      Dementia Father         No Known Allergies    Prior to Admission medications    Medication Sig Start Date End Date Taking? Authorizing Provider   amphetamine-dextroamphetamine (Adderall) 20 mg tablet Take 1 tablet (20 mg) by mouth 3 times a day. Do not fill until 2023  GANESH Gomez-CNP   amphetamine-dextroamphetamine (Adderall) 20 mg tablet Take 1 tablet (20 mg) by mouth 3 times a day. 23  GANESH Gomez-CNP   amphetamine-dextroamphetamine (Adderall) 20 mg tablet Take 1 tablet (20 mg) by mouth 3 times a day. Do not fill until 10/28/2023 8/29/23 9/28/23  CODY Gomez   atorvastatin (Lipitor) 40 mg tablet Take 1 tablet (40 mg) by mouth once daily at bedtime.    Historical Provider, MD   chlorhexidine (Peridex) 0.12 % solution Use as directed Do not start " before February 21, 2024. 2/21/24 2/22/24  Paulina Avilez, APRN-CNP   multivit-min/ferrous fumarate (MULTI VITAMIN ORAL) Take by mouth.    Historical Provider, MD   polyethylene glycol-electrolytes 420 gram solution drink 1/2 beginning at 6pm night before the procedure and start drinking the 2nd 1/2 5 hours before procedure time 3/8/23 2/12/24  Historical Provider, MD        Review of Systems   Musculoskeletal:  Positive for arthralgias.   All other systems reviewed and are negative.       Physical Exam  Vitals reviewed.   Constitutional:       Appearance: Normal appearance. He is normal weight.   Eyes:      Pupils: Pupils are equal, round, and reactive to light.   Cardiovascular:      Rate and Rhythm: Normal rate and regular rhythm.      Pulses: Normal pulses.      Heart sounds: Normal heart sounds.   Pulmonary:      Effort: Pulmonary effort is normal.      Breath sounds: Normal breath sounds.   Abdominal:      General: Bowel sounds are normal.      Palpations: Abdomen is soft.   Musculoskeletal:      Cervical back: Normal range of motion.      Comments: Limited both shoulders   Skin:     General: Skin is warm and dry.   Neurological:      General: No focal deficit present.      Mental Status: He is alert and oriented to person, place, and time.   Psychiatric:         Mood and Affect: Mood normal.         Behavior: Behavior normal.          PAT AIRWAY:   Airway:     Mallampati::  III    TM distance::  <3 FB    Neck ROM::  Full      There were no vitals taken for this visit.    DASI Risk Score    No data to display       Caprini DVT Assessment      Flowsheet Row Most Recent Value   DVT Score 6   Current Status Major surgery planned, including arthroscopic and laproscopic (1-2 hours)   History Prior major surgery   Age 60-75 years   BMI 30 or less          Modified Frailty Index    No data to display       CHADS2 Stroke Risk  Current as of 36 minutes ago        N/A 3 - 100%: High Risk   2 - 3%: Medium Risk   0 -  2%: Low Risk     Last Change: N/A          This score determines the patient's risk of having a stroke if the patient has atrial fibrillation.        This score is not applicable to this patient. Components are not calculated.          Revised Cardiac Risk Index      Flowsheet Row Most Recent Value   Revised Cardiac Risk Calculator 0          Apfel Simplified Score      Flowsheet Row Most Recent Value   Apfel Simplified Score Calculator 2          Risk Analysis Index Results This Encounter    No data found in the last 1 encounters.     Reviewed :stress test done after covid 8/2021 negative; EKG 8/2021 old inferior mi, unchanged ekg  CT heart calcium scoring 9/2021: IMPRESSION:  1. Coronary artery calcium score of 160.5 *. This represents a mild  increase in atherosclerotic burden when compared to a study of  06/12/2015.  2. There is a smallsized hiatal hernia.    Assessment and Plan:    Left shoulder pain- planned  Left Shoulder Arthroscopy; Rotator Cuff Repair With Collagen Implant; Decompression; Debridement; Biceps Tenodesis (Beach Chair; Arthrex Anchors; Lange & Nephew Regeneten; Aveumed) - Left   2. HLP- managed  3. ADD- managed   4. ASA- 2, labs ordered today mrsa, ua, cbc, bmp  5. Ariscat- Low

## 2024-02-13 ENCOUNTER — LAB (OUTPATIENT)
Dept: LAB | Facility: LAB | Age: 64
End: 2024-02-13
Payer: COMMERCIAL

## 2024-02-13 ENCOUNTER — OFFICE VISIT (OUTPATIENT)
Dept: PRIMARY CARE | Facility: CLINIC | Age: 64
End: 2024-02-13
Payer: COMMERCIAL

## 2024-02-13 VITALS
HEART RATE: 78 BPM | BODY MASS INDEX: 27.49 KG/M2 | SYSTOLIC BLOOD PRESSURE: 115 MMHG | OXYGEN SATURATION: 98 % | TEMPERATURE: 97.4 F | WEIGHT: 192 LBS | HEIGHT: 70 IN | DIASTOLIC BLOOD PRESSURE: 78 MMHG

## 2024-02-13 DIAGNOSIS — Z00.00 HEALTHCARE MAINTENANCE: ICD-10-CM

## 2024-02-13 DIAGNOSIS — E78.49 FAMILIAL COMBINED HYPERLIPIDEMIA: ICD-10-CM

## 2024-02-13 DIAGNOSIS — I25.10 CORONARY ARTERIOSCLEROSIS: ICD-10-CM

## 2024-02-13 DIAGNOSIS — F90.2 ATTENTION DEFICIT HYPERACTIVITY DISORDER (ADHD), COMBINED TYPE: Primary | ICD-10-CM

## 2024-02-13 DIAGNOSIS — F90.9 ATTENTION DEFICIT HYPERACTIVITY DISORDER (ADHD), UNSPECIFIED ADHD TYPE: ICD-10-CM

## 2024-02-13 DIAGNOSIS — R73.01 ELEVATED FASTING BLOOD SUGAR: ICD-10-CM

## 2024-02-13 LAB
ALBUMIN SERPL BCP-MCNC: 4.4 G/DL (ref 3.4–5)
ALP SERPL-CCNC: 55 U/L (ref 33–136)
ALT SERPL W P-5'-P-CCNC: 25 U/L (ref 10–52)
AST SERPL W P-5'-P-CCNC: 19 U/L (ref 9–39)
BILIRUB DIRECT SERPL-MCNC: 0.1 MG/DL (ref 0–0.3)
BILIRUB SERPL-MCNC: 0.7 MG/DL (ref 0–1.2)
CHOLEST SERPL-MCNC: 189 MG/DL (ref 0–199)
CHOLESTEROL/HDL RATIO: 3.5
EST. AVERAGE GLUCOSE BLD GHB EST-MCNC: 123 MG/DL
HBA1C MFR BLD: 5.9 %
HDLC SERPL-MCNC: 54 MG/DL
LDLC SERPL CALC-MCNC: 114 MG/DL
NON HDL CHOLESTEROL: 135 MG/DL (ref 0–149)
PROT SERPL-MCNC: 6.4 G/DL (ref 6.4–8.2)
PSA SERPL-MCNC: 1.37 NG/ML
TRIGL SERPL-MCNC: 107 MG/DL (ref 0–149)
TSH SERPL-ACNC: 1.44 MIU/L (ref 0.44–3.98)
VLDL: 21 MG/DL (ref 0–40)

## 2024-02-13 PROCEDURE — 99214 OFFICE O/P EST MOD 30 MIN: CPT | Performed by: NURSE PRACTITIONER

## 2024-02-13 PROCEDURE — 1126F AMNT PAIN NOTED NONE PRSNT: CPT | Performed by: NURSE PRACTITIONER

## 2024-02-13 PROCEDURE — 84153 ASSAY OF PSA TOTAL: CPT

## 2024-02-13 PROCEDURE — 84443 ASSAY THYROID STIM HORMONE: CPT

## 2024-02-13 PROCEDURE — 36415 COLL VENOUS BLD VENIPUNCTURE: CPT

## 2024-02-13 PROCEDURE — 1160F RVW MEDS BY RX/DR IN RCRD: CPT | Performed by: NURSE PRACTITIONER

## 2024-02-13 PROCEDURE — 1159F MED LIST DOCD IN RCRD: CPT | Performed by: NURSE PRACTITIONER

## 2024-02-13 PROCEDURE — 1036F TOBACCO NON-USER: CPT | Performed by: NURSE PRACTITIONER

## 2024-02-13 PROCEDURE — 80076 HEPATIC FUNCTION PANEL: CPT

## 2024-02-13 PROCEDURE — 80061 LIPID PANEL: CPT

## 2024-02-13 PROCEDURE — 83036 HEMOGLOBIN GLYCOSYLATED A1C: CPT

## 2024-02-13 RX ORDER — DEXTROAMPHETAMINE SACCHARATE, AMPHETAMINE ASPARTATE, DEXTROAMPHETAMINE SULFATE AND AMPHETAMINE SULFATE 5; 5; 5; 5 MG/1; MG/1; MG/1; MG/1
20 TABLET ORAL 3 TIMES DAILY
Qty: 90 TABLET | Refills: 0 | Status: SHIPPED | OUTPATIENT
Start: 2024-02-13 | End: 2024-04-18

## 2024-02-13 RX ORDER — DEXTROAMPHETAMINE SACCHARATE, AMPHETAMINE ASPARTATE, DEXTROAMPHETAMINE SULFATE AND AMPHETAMINE SULFATE 5; 5; 5; 5 MG/1; MG/1; MG/1; MG/1
20 TABLET ORAL 3 TIMES DAILY
Qty: 90 TABLET | Refills: 0 | Status: SHIPPED | OUTPATIENT
Start: 2024-02-13 | End: 2024-03-14

## 2024-02-13 ASSESSMENT — ENCOUNTER SYMPTOMS
RESPIRATORY NEGATIVE: 1
PSYCHIATRIC NEGATIVE: 1
CONSTITUTIONAL NEGATIVE: 1
MUSCULOSKELETAL NEGATIVE: 1
PALPITATIONS: 0
NEUROLOGICAL NEGATIVE: 1
CARDIOVASCULAR NEGATIVE: 1

## 2024-02-13 NOTE — PROGRESS NOTES
OARRS:  No data recorded  Yes, I feel it is clincially indicated to continue the medication and have discussed with the patient risks/benefits/alternatives.    Is the patient prescribed a combination of a benzodiazepine and opioid?  No    Last Urine Drug Screen / ordered today: No  Recent Results (from the past 8760 hour(s))   Drug Screen, Urine With Reflex to Confirmation    Collection Time: 08/29/23  1:07 PM   Result Value Ref Range    DRUG SCREEN COMMENT URINE SEE BELOW     Amphetamine Screen, Urine PRESUMPTIVE POSITIVE (A) NEGATIVE    Barbiturate Screen, Urine PRESUMPTIVE NEGATIVE NEGATIVE    BENZODIAZEPINE (PRESENCE) IN URINE BY SCREEN METHOD PRESUMPTIVE NEGATIVE NEGATIVE    Cannabinoid Screen, Urine PRESUMPTIVE NEGATIVE NEGATIVE    Cocaine Screen, Urine PRESUMPTIVE NEGATIVE NEGATIVE    Fentanyl, Ur PRESUMPTIVE NEGATIVE NEGATIVE    Opiate Screen, Urine PRESUMPTIVE NEGATIVE NEGATIVE    Oxycodone Screen, Ur PRESUMPTIVE NEGATIVE NEGATIVE    PCP Screen, Urine PRESUMPTIVE NEGATIVE NEGATIVE   Amphetamine Confirm, Urine    Collection Time: 08/29/23  1:07 PM   Result Value Ref Range    Methamphetamine Quant, Ur <200 ng/mL    MDA, Urine <200 ng/mL    MDEA, Urine <200 ng/mL    Phentermine,Urine <200 ng/mL    Amphetamines,Urine >5000 ng/mL    MDMA, Urine <200 ng/mL     Results are as expected.         Controlled Substance Agreement:  Date of the Last Agreement: 8/29/2023  Reviewed Controlled Substance Agreement including but not limited to the benefits, risks, and alternatives to treatment with a Controlled Substance medication(s).    Stimulants:   What is the patient's goal of therapy? Better focus  Is this being achieved with current treatment? yes    Activities of Daily Living:   Is your overall impression that this patient is benefiting (symptom reduction outweighs side effects) from stimulant therapy? Yes     1. Physical Functioning: Better  2. Family Relationship: Better  3. Social Relationship: Better  4. Mood:  "Better  5. Sleep Patterns: Better  6. Overall Function: Better  Subjective   Patient ID: Asim Jeffries is a 63 y.o. male who presents for Med Refill.    HPI Presents today for follow up and medication renewal.  Tolerates medication well.  Having left shoulder rotator cuff repair soon.   Blood work for pre-op reviewed 2/12/2024.  And 2/2023  Immunizations as noted, never did have flu vaccine.   Colonoscopy up to date.   Due for yearly blood work  Exercising regularly  Feeling well in general  CACS 2021 reviewed with patient    Review of Systems   Constitutional: Negative.    Respiratory: Negative.     Cardiovascular: Negative.  Negative for chest pain, palpitations and leg swelling.   Musculoskeletal: Negative.    Neurological: Negative.    Psychiatric/Behavioral: Negative.         Objective   /78 (BP Location: Left arm, Patient Position: Sitting)   Pulse 78   Temp 36.3 °C (97.4 °F) (Temporal)   Ht 1.765 m (5' 9.5\")   Wt 87.1 kg (192 lb)   SpO2 98%   BMI 27.95 kg/m²     Physical Exam  Constitutional:       Appearance: Normal appearance.   Cardiovascular:      Rate and Rhythm: Normal rate and regular rhythm.   Pulmonary:      Effort: Pulmonary effort is normal.      Breath sounds: Normal breath sounds.   Abdominal:      General: Abdomen is flat. Bowel sounds are normal.      Palpations: Abdomen is soft.   Musculoskeletal:         General: Normal range of motion.   Neurological:      General: No focal deficit present.      Mental Status: He is alert.   Psychiatric:         Mood and Affect: Mood normal.         Behavior: Behavior normal.         Assessment/Plan   Problem List Items Addressed This Visit             ICD-10-CM    Attention deficit disorder (ADD) - Primary F98.8    Relevant Medications    amphetamine-dextroamphetamine (Adderall) 20 mg tablet    amphetamine-dextroamphetamine (Adderall) 20 mg tablet    amphetamine-dextroamphetamine (Adderall) 20 mg tablet    Coronary arteriosclerosis I25.10    " Elevated fasting blood sugar R73.01    Relevant Orders    Hemoglobin A1C     Other Visit Diagnoses         Codes    Healthcare maintenance     Z00.00    Relevant Orders    Lipid Panel    Prostate Specific Antigen, Screen    Thyroid Stimulating Hormone    Hemoglobin A1C    Hepatic function panel

## 2024-02-13 NOTE — PATIENT INSTRUCTIONS
I will follow up with the blood work and refill medications.   Adderall refilled.   Follow up in 6 months and as needed

## 2024-02-14 DIAGNOSIS — I25.10 CORONARY ARTERIOSCLEROSIS: ICD-10-CM

## 2024-02-14 DIAGNOSIS — E78.49 FAMILIAL COMBINED HYPERLIPIDEMIA: Primary | ICD-10-CM

## 2024-02-14 LAB — STAPHYLOCOCCUS SPEC CULT: NORMAL

## 2024-02-14 RX ORDER — ATORVASTATIN CALCIUM 40 MG/1
40 TABLET, FILM COATED ORAL NIGHTLY
Qty: 90 TABLET | Refills: 3 | Status: SHIPPED | OUTPATIENT
Start: 2024-02-14

## 2024-02-20 ENCOUNTER — ANESTHESIA EVENT (OUTPATIENT)
Dept: OPERATING ROOM | Facility: HOSPITAL | Age: 64
End: 2024-02-20
Payer: COMMERCIAL

## 2024-02-21 RX ORDER — CEFAZOLIN SODIUM 2 G/100ML
2 INJECTION, SOLUTION INTRAVENOUS ONCE
Status: CANCELLED | OUTPATIENT
Start: 2024-02-21 | End: 2024-02-21

## 2024-02-22 ENCOUNTER — HOSPITAL ENCOUNTER (OUTPATIENT)
Facility: HOSPITAL | Age: 64
Setting detail: OUTPATIENT SURGERY
Discharge: HOME | End: 2024-02-22
Attending: ORTHOPAEDIC SURGERY | Admitting: ORTHOPAEDIC SURGERY
Payer: COMMERCIAL

## 2024-02-22 ENCOUNTER — ANESTHESIA (OUTPATIENT)
Dept: OPERATING ROOM | Facility: HOSPITAL | Age: 64
End: 2024-02-22
Payer: COMMERCIAL

## 2024-02-22 VITALS
SYSTOLIC BLOOD PRESSURE: 127 MMHG | RESPIRATION RATE: 18 BRPM | TEMPERATURE: 97.3 F | DIASTOLIC BLOOD PRESSURE: 73 MMHG | BODY MASS INDEX: 28.08 KG/M2 | WEIGHT: 189.6 LBS | OXYGEN SATURATION: 97 % | HEART RATE: 74 BPM | HEIGHT: 69 IN

## 2024-02-22 DIAGNOSIS — Z98.890 POST-OPERATIVE STATE: ICD-10-CM

## 2024-02-22 DIAGNOSIS — Z98.890 S/P LEFT ROTATOR CUFF REPAIR: Primary | ICD-10-CM

## 2024-02-22 DIAGNOSIS — G89.18 ACUTE POST-OPERATIVE PAIN: Primary | ICD-10-CM

## 2024-02-22 PROCEDURE — 2780000003 HC OR 278 NO HCPCS: Performed by: ORTHOPAEDIC SURGERY

## 2024-02-22 PROCEDURE — 3700000001 HC GENERAL ANESTHESIA TIME - INITIAL BASE CHARGE: Performed by: ORTHOPAEDIC SURGERY

## 2024-02-22 PROCEDURE — 3700000002 HC GENERAL ANESTHESIA TIME - EACH INCREMENTAL 1 MINUTE: Performed by: ORTHOPAEDIC SURGERY

## 2024-02-22 PROCEDURE — 64415 NJX AA&/STRD BRCH PLXS IMG: CPT | Performed by: STUDENT IN AN ORGANIZED HEALTH CARE EDUCATION/TRAINING PROGRAM

## 2024-02-22 PROCEDURE — 2500000004 HC RX 250 GENERAL PHARMACY W/ HCPCS (ALT 636 FOR OP/ED): Performed by: STUDENT IN AN ORGANIZED HEALTH CARE EDUCATION/TRAINING PROGRAM

## 2024-02-22 PROCEDURE — A29827 PR SHLDR ARTHROSCOP,SURG,W/ROTAT CUFF REPR: Performed by: ANESTHESIOLOGIST ASSISTANT

## 2024-02-22 PROCEDURE — 29826 SHO ARTHRS SRG DECOMPRESSION: CPT | Performed by: ORTHOPAEDIC SURGERY

## 2024-02-22 PROCEDURE — 99203 OFFICE O/P NEW LOW 30 MIN: CPT | Performed by: NURSE PRACTITIONER

## 2024-02-22 PROCEDURE — 7100000010 HC PHASE TWO TIME - EACH INCREMENTAL 1 MINUTE: Performed by: ORTHOPAEDIC SURGERY

## 2024-02-22 PROCEDURE — 3600000009 HC OR TIME - EACH INCREMENTAL 1 MINUTE - PROCEDURE LEVEL FOUR: Performed by: ORTHOPAEDIC SURGERY

## 2024-02-22 PROCEDURE — 2500000005 HC RX 250 GENERAL PHARMACY W/O HCPCS: Performed by: ANESTHESIOLOGIST ASSISTANT

## 2024-02-22 PROCEDURE — 7100000001 HC RECOVERY ROOM TIME - INITIAL BASE CHARGE: Performed by: ORTHOPAEDIC SURGERY

## 2024-02-22 PROCEDURE — 2720000007 HC OR 272 NO HCPCS: Performed by: ORTHOPAEDIC SURGERY

## 2024-02-22 PROCEDURE — 29823 SHO ARTHRS SRG XTNSV DBRDMT: CPT | Performed by: ORTHOPAEDIC SURGERY

## 2024-02-22 PROCEDURE — 29826 SHO ARTHRS SRG DECOMPRESSION: CPT | Performed by: NURSE PRACTITIONER

## 2024-02-22 PROCEDURE — 7100000002 HC RECOVERY ROOM TIME - EACH INCREMENTAL 1 MINUTE: Performed by: ORTHOPAEDIC SURGERY

## 2024-02-22 PROCEDURE — 3600000004 HC OR TIME - INITIAL BASE CHARGE - PROCEDURE LEVEL FOUR: Performed by: ORTHOPAEDIC SURGERY

## 2024-02-22 PROCEDURE — 2500000004 HC RX 250 GENERAL PHARMACY W/ HCPCS (ALT 636 FOR OP/ED): Performed by: ANESTHESIOLOGIST ASSISTANT

## 2024-02-22 PROCEDURE — 2500000004 HC RX 250 GENERAL PHARMACY W/ HCPCS (ALT 636 FOR OP/ED): Performed by: NURSE PRACTITIONER

## 2024-02-22 PROCEDURE — 29827 SHO ARTHRS SRG RT8TR CUF RPR: CPT | Performed by: NURSE PRACTITIONER

## 2024-02-22 PROCEDURE — A4217 STERILE WATER/SALINE, 500 ML: HCPCS | Performed by: ORTHOPAEDIC SURGERY

## 2024-02-22 PROCEDURE — 2500000004 HC RX 250 GENERAL PHARMACY W/ HCPCS (ALT 636 FOR OP/ED): Performed by: ORTHOPAEDIC SURGERY

## 2024-02-22 PROCEDURE — 7100000009 HC PHASE TWO TIME - INITIAL BASE CHARGE: Performed by: ORTHOPAEDIC SURGERY

## 2024-02-22 PROCEDURE — 29827 SHO ARTHRS SRG RT8TR CUF RPR: CPT | Performed by: ORTHOPAEDIC SURGERY

## 2024-02-22 PROCEDURE — 29823 SHO ARTHRS SRG XTNSV DBRDMT: CPT | Performed by: NURSE PRACTITIONER

## 2024-02-22 PROCEDURE — A29827 PR SHLDR ARTHROSCOP,SURG,W/ROTAT CUFF REPR: Performed by: STUDENT IN AN ORGANIZED HEALTH CARE EDUCATION/TRAINING PROGRAM

## 2024-02-22 PROCEDURE — A4649 SURGICAL SUPPLIES: HCPCS | Performed by: ORTHOPAEDIC SURGERY

## 2024-02-22 DEVICE — IMPLANTABLE DEVICE: Type: IMPLANTABLE DEVICE | Site: SHOULDER | Status: FUNCTIONAL

## 2024-02-22 RX ORDER — DIPHENHYDRAMINE HYDROCHLORIDE 50 MG/ML
12.5 INJECTION INTRAMUSCULAR; INTRAVENOUS ONCE AS NEEDED
Status: DISCONTINUED | OUTPATIENT
Start: 2024-02-22 | End: 2024-02-22 | Stop reason: HOSPADM

## 2024-02-22 RX ORDER — OXYCODONE AND ACETAMINOPHEN 5; 325 MG/1; MG/1
1 TABLET ORAL EVERY 6 HOURS PRN
Qty: 24 TABLET | Refills: 0 | Status: SHIPPED | OUTPATIENT
Start: 2024-02-22 | End: 2024-02-28

## 2024-02-22 RX ORDER — CEFAZOLIN SODIUM 2 G/100ML
INJECTION, SOLUTION INTRAVENOUS AS NEEDED
Status: DISCONTINUED | OUTPATIENT
Start: 2024-02-22 | End: 2024-02-22

## 2024-02-22 RX ORDER — OXYCODONE HYDROCHLORIDE 5 MG/1
5 TABLET ORAL EVERY 4 HOURS PRN
Status: DISCONTINUED | OUTPATIENT
Start: 2024-02-22 | End: 2024-02-22 | Stop reason: HOSPADM

## 2024-02-22 RX ORDER — SODIUM CHLORIDE, SODIUM LACTATE, POTASSIUM CHLORIDE, CALCIUM CHLORIDE 600; 310; 30; 20 MG/100ML; MG/100ML; MG/100ML; MG/100ML
INJECTION, SOLUTION INTRAVENOUS CONTINUOUS PRN
Status: DISCONTINUED | OUTPATIENT
Start: 2024-02-22 | End: 2024-02-22

## 2024-02-22 RX ORDER — ONDANSETRON HYDROCHLORIDE 2 MG/ML
4 INJECTION, SOLUTION INTRAVENOUS ONCE AS NEEDED
Status: DISCONTINUED | OUTPATIENT
Start: 2024-02-22 | End: 2024-02-22 | Stop reason: HOSPADM

## 2024-02-22 RX ORDER — LABETALOL HYDROCHLORIDE 5 MG/ML
5 INJECTION, SOLUTION INTRAVENOUS ONCE AS NEEDED
Status: DISCONTINUED | OUTPATIENT
Start: 2024-02-22 | End: 2024-02-22 | Stop reason: HOSPADM

## 2024-02-22 RX ORDER — FENTANYL CITRATE 50 UG/ML
INJECTION, SOLUTION INTRAMUSCULAR; INTRAVENOUS AS NEEDED
Status: DISCONTINUED | OUTPATIENT
Start: 2024-02-22 | End: 2024-02-22

## 2024-02-22 RX ORDER — OMEPRAZOLE 40 MG/1
40 CAPSULE, DELAYED RELEASE ORAL
Qty: 3 CAPSULE | Refills: 0 | Status: SHIPPED | OUTPATIENT
Start: 2024-02-22 | End: 2024-04-24 | Stop reason: WASHOUT

## 2024-02-22 RX ORDER — FENTANYL CITRATE 50 UG/ML
100 INJECTION, SOLUTION INTRAMUSCULAR; INTRAVENOUS ONCE
Status: COMPLETED | OUTPATIENT
Start: 2024-02-22 | End: 2024-02-22

## 2024-02-22 RX ORDER — DOCUSATE SODIUM 100 MG/1
100 CAPSULE, LIQUID FILLED ORAL 2 TIMES DAILY
Qty: 30 CAPSULE | Refills: 0 | Status: SHIPPED | OUTPATIENT
Start: 2024-02-22 | End: 2024-03-08

## 2024-02-22 RX ORDER — LIDOCAINE HYDROCHLORIDE 10 MG/ML
INJECTION, SOLUTION INTRAVENOUS AS NEEDED
Status: DISCONTINUED | OUTPATIENT
Start: 2024-02-22 | End: 2024-02-22

## 2024-02-22 RX ORDER — ASPIRIN 81 MG/1
81 TABLET ORAL DAILY
Qty: 14 TABLET | Refills: 0 | Status: SHIPPED | OUTPATIENT
Start: 2024-02-22 | End: 2024-03-07

## 2024-02-22 RX ORDER — KETOROLAC TROMETHAMINE 10 MG/1
10 TABLET, FILM COATED ORAL EVERY 6 HOURS PRN
Qty: 12 TABLET | Refills: 0 | Status: SHIPPED | OUTPATIENT
Start: 2024-02-22 | End: 2024-02-25

## 2024-02-22 RX ORDER — ROCURONIUM BROMIDE 10 MG/ML
INJECTION, SOLUTION INTRAVENOUS AS NEEDED
Status: DISCONTINUED | OUTPATIENT
Start: 2024-02-22 | End: 2024-02-22

## 2024-02-22 RX ORDER — PROPOFOL 10 MG/ML
INJECTION, EMULSION INTRAVENOUS AS NEEDED
Status: DISCONTINUED | OUTPATIENT
Start: 2024-02-22 | End: 2024-02-22

## 2024-02-22 RX ORDER — SODIUM CHLORIDE, SODIUM LACTATE, POTASSIUM CHLORIDE, CALCIUM CHLORIDE 600; 310; 30; 20 MG/100ML; MG/100ML; MG/100ML; MG/100ML
50 INJECTION, SOLUTION INTRAVENOUS CONTINUOUS
Status: DISCONTINUED | OUTPATIENT
Start: 2024-02-22 | End: 2024-02-22 | Stop reason: HOSPADM

## 2024-02-22 RX ORDER — MIDAZOLAM HYDROCHLORIDE 1 MG/ML
2 INJECTION, SOLUTION INTRAMUSCULAR; INTRAVENOUS ONCE
Status: COMPLETED | OUTPATIENT
Start: 2024-02-22 | End: 2024-02-22

## 2024-02-22 RX ORDER — LIDOCAINE HYDROCHLORIDE 10 MG/ML
0.1 INJECTION INFILTRATION; PERINEURAL ONCE
Status: DISCONTINUED | OUTPATIENT
Start: 2024-02-22 | End: 2024-02-22 | Stop reason: HOSPADM

## 2024-02-22 RX ORDER — ONDANSETRON HYDROCHLORIDE 2 MG/ML
INJECTION, SOLUTION INTRAVENOUS AS NEEDED
Status: DISCONTINUED | OUTPATIENT
Start: 2024-02-22 | End: 2024-02-22

## 2024-02-22 RX ORDER — SODIUM CHLORIDE, SODIUM LACTATE, POTASSIUM CHLORIDE, CALCIUM CHLORIDE 600; 310; 30; 20 MG/100ML; MG/100ML; MG/100ML; MG/100ML
100 INJECTION, SOLUTION INTRAVENOUS CONTINUOUS
Status: DISCONTINUED | OUTPATIENT
Start: 2024-02-22 | End: 2024-02-22 | Stop reason: HOSPADM

## 2024-02-22 RX ORDER — DEXAMETHASONE SODIUM PHOSPHATE 4 MG/ML
INJECTION, SOLUTION INTRA-ARTICULAR; INTRALESIONAL; INTRAMUSCULAR; INTRAVENOUS; SOFT TISSUE AS NEEDED
Status: DISCONTINUED | OUTPATIENT
Start: 2024-02-22 | End: 2024-02-22

## 2024-02-22 RX ADMIN — FENTANYL CITRATE 25 MCG: 50 INJECTION INTRAMUSCULAR; INTRAVENOUS at 11:54

## 2024-02-22 RX ADMIN — LIDOCAINE HYDROCHLORIDE 30 MG: 10 INJECTION, SOLUTION INTRAVENOUS at 10:12

## 2024-02-22 RX ADMIN — SODIUM CHLORIDE, SODIUM LACTATE, POTASSIUM CHLORIDE, AND CALCIUM CHLORIDE 50 ML/HR: 600; 310; 30; 20 INJECTION, SOLUTION INTRAVENOUS at 08:22

## 2024-02-22 RX ADMIN — SUGAMMADEX 200 MG: 100 INJECTION, SOLUTION INTRAVENOUS at 12:02

## 2024-02-22 RX ADMIN — FENTANYL CITRATE 25 MCG: 50 INJECTION INTRAMUSCULAR; INTRAVENOUS at 12:11

## 2024-02-22 RX ADMIN — CEFAZOLIN SODIUM 2 G: 2 INJECTION, SOLUTION INTRAVENOUS at 10:04

## 2024-02-22 RX ADMIN — DEXAMETHASONE SODIUM PHOSPHATE 8 MG: 4 INJECTION, SOLUTION INTRAMUSCULAR; INTRAVENOUS at 10:23

## 2024-02-22 RX ADMIN — FENTANYL CITRATE 100 MCG: 50 INJECTION INTRAMUSCULAR; INTRAVENOUS at 09:46

## 2024-02-22 RX ADMIN — FENTANYL CITRATE 50 MCG: 50 INJECTION INTRAMUSCULAR; INTRAVENOUS at 11:36

## 2024-02-22 RX ADMIN — ONDANSETRON 4 MG: 2 INJECTION, SOLUTION INTRAMUSCULAR; INTRAVENOUS at 11:54

## 2024-02-22 RX ADMIN — MIDAZOLAM 2 MG: 1 INJECTION INTRAMUSCULAR; INTRAVENOUS at 10:00

## 2024-02-22 RX ADMIN — SODIUM CHLORIDE, POTASSIUM CHLORIDE, SODIUM LACTATE AND CALCIUM CHLORIDE: 600; 310; 30; 20 INJECTION, SOLUTION INTRAVENOUS at 10:04

## 2024-02-22 RX ADMIN — PROPOFOL 200 MG: 10 INJECTION, EMULSION INTRAVENOUS at 10:12

## 2024-02-22 RX ADMIN — ROCURONIUM BROMIDE 50 MG: 10 INJECTION, SOLUTION INTRAVENOUS at 10:12

## 2024-02-22 ASSESSMENT — PAIN SCALES - GENERAL
PAINLEVEL_OUTOF10: 0 - NO PAIN

## 2024-02-22 ASSESSMENT — PAIN - FUNCTIONAL ASSESSMENT
PAIN_FUNCTIONAL_ASSESSMENT: 0-10

## 2024-02-22 NOTE — PERIOPERATIVE NURSING NOTE
Tt received from OR via [employer], julios on, report received. Pt antonio, denies pian or PONV. VS

## 2024-02-22 NOTE — ANESTHESIA POSTPROCEDURE EVALUATION
"Patient: Paramjit Jeffries \"Asim\"    Procedure Summary       Date: 02/22/24 Room / Location: [facility] / [facility]    Anesthesia Start: 1004 Anesthesia Stop: 1217    Procedure: Left Shoulder Arthroscopy; Rotator Cuff Repair With ; Decompression; Debridement; Biceps Tenodesis (Left: Shoulder) Diagnosis:       S/P left rotator cuff repair      (S/P left rotator cuff repair [Z98.890])    Surgeons: Paramjit Rolle MD Responsible Provider: Mark Manning MD    Anesthesia Type: general ASA Status: 2            Anesthesia Type: general    Vitals Value Taken Time   /82 02/22/24 1230   Temp 36 °C (96.8 °F) 02/22/24 1213   Pulse 68 02/22/24 1230   Resp 13 02/22/24 1230   SpO2 97 % 02/22/24 1230       Anesthesia Post Evaluation    Patient location during evaluation: bedside  Patient participation: complete - patient participated  Level of consciousness: awake  Pain management: adequate  Multimodal analgesia pain management approach  Airway patency: patent  Cardiovascular status: stable  Respiratory status: spontaneous ventilation and unassisted  Hydration status: acceptable  Postoperative Nausea and Vomiting: none  Comments: No significant PONV.        No notable events documented.    "

## 2024-02-22 NOTE — OP NOTE
"Left Shoulder Arthroscopy; Rotator Cuff Repair With ; Decompression; Debridement; Biceps Tenodesis (L) Operative Note     Date: 2024  OR Location: [facility]    Name: Paramjit Jeffries \"Asim\", : 1960, Age: 63 y.o., MRN: [id number], Sex: male    Diagnosis  Pre-op Diagnosis     * S/P left rotator cuff repair [Z98.890] Post-op Diagnosis     * S/P left rotator cuff repair [Z98.890]     Procedures  Left Shoulder Arthroscopy; Rotator Cuff Repair With ; Decompression; Debridement; Biceps Tenodesis  35845 - MI SURGICAL ARTHROSCOPY SHOULDER W/ROTATOR CUFF RPR    MI SURGICAL ARTHROSCOPY SHOULDER XTNSV DBRDMT 3+ [49809]  MI TENODESIS LONG TENDON BICEPS [47369]  Surgeons      * Paramjit Rolle - Primary    Resident/Fellow/Other Assistant:  Surgeon(s) and Role:  rao Chambers cnp    Procedure Summary  Anesthesia: Consult  ASA: II  Anesthesia Staff: Anesthesiologist: Mark Manning MD  C-AA: SAMEER De La Rosa  Estimated Blood Loss: Minimal mL  Intra-op Medications:   Administrations occurring from 0930 to 1130 on 24:   Medication Name Total Dose   lactated Ringer's infusion Cannot be calculated   fentaNYL PF (Sublimaze) injection 100 mcg 100 mcg   midazolam (Versed) injection 2 mg 2 mg              Anesthesia Record               Intraprocedure I/O Totals          Intake    lactated Ringer's infusion 85.00 mL    ceFAZolin in dextrose (iso-os) 2 gram/100 mL 100.00 mL    Total Intake 185 mL          Specimen: No specimens collected     Staff:   Circulator: Clarissa Cunningham RN; Keren Rodgers RN  Scrub Person: Theo Goldstein         Drains and/or Catheters: * None in log *    Tourniquet Times:         Implants:  Implants       Type Name Action Serial No.       PHANTOM - LP 5.5 X 22 MM Implanted       Q-FIX ALL-SUTURE ANCHOR 2.8MM W/TWO MINITAPE SUTURE BLUE COBRAID WHITE Implanted       Q-FIX ALL-SUTURE ANCHOR 2.8MM W/TWO MINITAPE SUTURE BLUE COBRAID WHITE Implanted       PHANTOM - LP 5.5 X 22MM SUTURE ANCHOR , " PEEK Implanted               Findings: Consistent with diagnosis patient had a subscapularis supraspinatus infraspinatus tear with retraction to the level of the glenoid.  Arthritis prior long head of the biceps rupture    Indications: Asim Jeffries is an 63 y.o. male who is having surgery for S/P left rotator cuff repair [Z98.890].  Biceps tendon long head rupture.  Anterior posterior labral degenerative tearing grade 0-1 arthritic changes    The patient was seen in the preoperative area. The risks, benefits, complications, treatment options, non-operative alternatives, expected recovery and outcomes were discussed with the patient. The possibilities of reaction to medication, pulmonary aspiration, injury to surrounding structures, bleeding, recurrent infection, the need for additional procedures, failure to diagnose a condition, and creating a complication requiring transfusion or operation were discussed with the patient. The patient concurred with the proposed plan, giving informed consent.  The site of surgery was properly noted/marked if necessary per policy. The patient has been actively warmed in preoperative area. Preoperative antibiotics have been ordered and given within 1 hours of incision. Venous thrombosis prophylaxis have been ordered including bilateral sequential compression devices    Procedure Details:    BRIEF HISTORY:    This is a very pleasant person who suffered from a large to massive rotator cuff tear. failed conservative management.  Risks, benefits, and alternatives of the surgery were discussed.  understood those risks of this not being repairable and not healing.  They understood the pain relief may or may not occur.  understood salvage options including reverse shoulder replacement.  They wished to proceed with arthroscopic procedure.    OPERATIVE REPORT:    On the day of surgery, they wereseen in preop holding, identified as correct patient.  shoulder was identified and marked as the  correct operative site.  Risks, benefits, and alternatives of the surgery were discussed including risk of infection, bleeding, nerve injury, no improvement in pain.  understood these risks and wished to proceed.  Consent was signed.  Shoulder marked.  Interscalene nerve block was performed.  Taken to the operating room, isela ensured, ensuring correct patient,  correct operative site.  All in agreement with the huddle.  Intubated, sedated, and positioned in 90-degree beach chair position.  All bony prominences well padded.  Head was held in neutral position by a Tenet head edge.  We prepped and draped in standard fashion.  Standard posterior portal was made.  Anteriormedial portals were made.  We extensively debrided the anterior, superior, and posterior labrum as well as underside of rotator cuff.  And the articular cartilage along the anterior edge.  We then went to the subacromial space, did extensive bursectomy and removed the type 2 arcromial spur, preserving ca ligament.  We did extensive release.  Rotator interval release was performed.  At this point I repaired the subscapularis with an anterior lateral portal.  Mattress was fashion.  Posterior drawer external rotation negative after repair.  posterior rotator cuff were intact.  The supra and infraspinatus were torn and retracted to level of glenoid and articular region.  After doing the complete releases, we were able to get it back to the greater tuberosity.  We made 2 anchors after preparing the greater tuberosity with a rasp and tape was used, inverted mattresses..  We tied down the sutures in standard fashion.  We brought the tapes out the lateral anchor.  This compressed the rotator cuff onto the greater tuberosity.  I was very satisfied as this was stable and in excellent position.  We copiously irrigated the wound.  Standard incisions were closed and I awoke the patient from anesthesia and transferred to PACU, where she was recovering.  I was  present and scrubbed for all critical portions of procedure.  Please note that we will be billing for my nurse practitioner's first assist as she was critical in the center for successful completion of the case including positioning of the body, retraction, suture management, placement of the implants and wound closure. There was no qualified resident available for the entire case  Complications:  None; patient tolerated the procedure well.    Disposition: PACU - hemodynamically stable.  Condition: stable         Additional Details: None    Attending Attestation: I was present and scrubbed for the entire procedure.    Paramjit Rolle  Phone Number: [phone]

## 2024-02-22 NOTE — ANESTHESIA PROCEDURE NOTES
Airway  Date/Time: 2/22/2024 10:14 AM  Urgency: elective    Airway not difficult    Staffing  Performed: SAMEER   Authorized by: Mark Manning MD    Performed by: SAMEER De La Rosa  Patient location during procedure: OR    Indications and Patient Condition  Indications for airway management: anesthesia and airway protection  Spontaneous ventilation: present  Sedation level: deep  Preoxygenated: yes  Patient position: sniffing  Mask difficulty assessment: 1 - vent by mask  Planned trial extubation    Final Airway Details  Final airway type: endotracheal airway      Successful airway: ETT  Cuffed: yes   Successful intubation technique: direct laryngoscopy  Facilitating devices/methods: intubating stylet  Endotracheal tube insertion site: oral  Blade: Deacon  Blade size: #3  ETT size (mm): 7.5  Cormack-Lehane Classification: grade I - full view of glottis  Placement verified by: chest auscultation and capnometry   Cuff volume (mL): 8  Measured from: teeth  ETT to teeth (cm): 23  Number of attempts at approach: 1    Additional Comments  EASY ETT PLACEMENT  ATRAUMATIC  SOFT BITE BLOCK PLACED

## 2024-02-22 NOTE — ANESTHESIA PREPROCEDURE EVALUATION
"Patient: Paramjit Jeffries \"Asim\"    Procedure Information       Date/Time: 02/22/24 0930    Procedure: Left Shoulder Arthroscopy; Rotator Cuff Repair With Collagen Implant; Decompression; Debridement; Biceps Tenodesis (Beach Chair; Arthrex Anchors; Lange & Nephew Regeneten; Aveumed) (Left: Shoulder)    Location: [facility] / [facility]    Surgeons: Paramjit Rolle MD            Relevant Problems   Cardiovascular   (+) Coronary arteriosclerosis   (+) Familial combined hyperlipidemia      Pulmonary   (+) Shortness of breath on exertion       Clinical information reviewed:   Tobacco  Allergies  Meds   Med Hx  Surg Hx   Fam Hx  Soc Hx        NPO Detail:  NPO/Void Status  Date of Last Liquid: 02/21/24  Time of Last Liquid: 2230  Date of Last Solid: 02/21/24  Time of Last Solid: 2100  Time of Last Void: 0700         Physical Exam    Airway  Mallampati: II  TM distance: >3 FB  Neck ROM: full     Cardiovascular   Rhythm: regular  Rate: normal     Dental    Pulmonary   Breath sounds clear to auscultation     Abdominal            Anesthesia Plan    History of general anesthesia?: yes  History of complications of general anesthesia?: no    ASA 2     general     intravenous induction   Anesthetic plan and risks discussed with patient.    Plan discussed with CRNA.      "

## 2024-02-22 NOTE — ANESTHESIA PROCEDURE NOTES
Peripheral Block    Patient location during procedure: pre-op  Start time: 2/22/2024 9:46 AM  End time: 2/22/2024 9:52 AM  Reason for block: at surgeon's request and post-op pain management  Staffing  Performed: attending   Authorized by: Mark Manning MD    Performed by: Mark Manning MD  Preanesthetic Checklist  Completed: patient identified, IV checked, site marked, risks and benefits discussed, surgical consent, monitors and equipment checked, pre-op evaluation and timeout performed   Timeout performed at:   Peripheral Block  Patient position: laying flat  Prep: ChloraPrep and site prepped and draped  Patient monitoring: continuous pulse ox, heart rate and cardiac monitor  Block type: interscalene  Laterality: left  Injection technique: single-shot  Guidance: ultrasound guided  Local infiltration: lidocaine  Infiltration strength: 1 %  Dose: 3 mL  Needle  Needle type: short-bevel   Needle gauge: 22 G  Needle length: 8 cm  Needle localization: ultrasound guidance (Image saved in chart)  Test dose: negative  Assessment  Injection assessment: negative aspiration for heme, local visualized surrounding nerve on ultrasound, no paresthesia on injection and incremental injection  Heart rate change: no  Slow fractionated injection: yes  Additional Notes  Pre-medication with Versed 2 mg intravenously.  Verbal consent was provided by the patient and/or surrogate decision maker after a discussion of risks and benefits. Anticoagulation (if any) was held per JELENA guidelines.  Aseptic prep and draping was performed. There was no resistance to injection and appropriate injection pressures were maintained based on tactile feedback. Throughout the procedure, there was consistent and meaningful verbal communication with the patient. Post procedure vital signs were stable and no immediate complications were noted. PACU will provide written instructions that outline the specific precautions to be taken when caring  for an insensate extremity.

## 2024-02-22 NOTE — PERIOPERATIVE NURSING NOTE
Pt meets criteria for PH2, removed from monitors. Safety maintained, no other needs identified @ this time.

## 2024-02-26 ASSESSMENT — PAIN SCALES - GENERAL: PAINLEVEL_OUTOF10: 2

## 2024-03-07 ENCOUNTER — OFFICE VISIT (OUTPATIENT)
Dept: ORTHOPEDIC SURGERY | Facility: HOSPITAL | Age: 64
End: 2024-03-07
Payer: COMMERCIAL

## 2024-03-07 VITALS — WEIGHT: 187 LBS | HEIGHT: 69 IN | BODY MASS INDEX: 27.7 KG/M2

## 2024-03-07 DIAGNOSIS — Z98.890 S/P LEFT ROTATOR CUFF REPAIR: Primary | ICD-10-CM

## 2024-03-07 DIAGNOSIS — M75.122 COMPLETE TEAR OF LEFT ROTATOR CUFF, UNSPECIFIED WHETHER TRAUMATIC: ICD-10-CM

## 2024-03-07 PROCEDURE — 1125F AMNT PAIN NOTED PAIN PRSNT: CPT | Performed by: NURSE PRACTITIONER

## 2024-03-07 PROCEDURE — 99024 POSTOP FOLLOW-UP VISIT: CPT | Performed by: NURSE PRACTITIONER

## 2024-03-07 PROCEDURE — 1160F RVW MEDS BY RX/DR IN RCRD: CPT | Performed by: NURSE PRACTITIONER

## 2024-03-07 PROCEDURE — 1036F TOBACCO NON-USER: CPT | Performed by: NURSE PRACTITIONER

## 2024-03-07 PROCEDURE — 1159F MED LIST DOCD IN RCRD: CPT | Performed by: NURSE PRACTITIONER

## 2024-03-07 ASSESSMENT — PAIN - FUNCTIONAL ASSESSMENT: PAIN_FUNCTIONAL_ASSESSMENT: 0-10

## 2024-03-07 ASSESSMENT — PAIN SCALES - GENERAL: PAINLEVEL_OUTOF10: 2

## 2024-03-07 ASSESSMENT — PAIN DESCRIPTION - DESCRIPTORS: DESCRIPTORS: TIGHTNESS

## 2024-03-07 NOTE — PROGRESS NOTES
Provider Impression/Assessment:  Paramjit Nick is 2 weeks status post left arthroscopic decompression, debridement, rotator cuff repair and biceps tenodesis on 2/22/24.    Patient Discussion/Plan:  For the next 2 weeks, they are to do elbow and wrist range of motion exercises that have been discussed with them. These should be done 5-6 times a day. We talked about scapular stabilizing exercises that they can do right now as well. No lifting more than a coffee cup for the next 6 weeks. No torquing motions. No opening or closing doors. It is highly encouraged that the sling should be worn the majority of time over the next 2 weeks, while in and out of the house. It should be worn with sleeping as well. In 2 weeks time, they will initiate physical therapy for range of motion exercises and discard the sling. A prescription for therapy was given today as well as [facility] locations to get therapy completed. We will likely allow them to progress to strengthening at 3 months.     We discussed compliance and not overdoing it. Patient should be seen again in clinic in 6 weeks for a repeat clinical check with Dr Rolle. No repeat xrays needed at that time.     All questions were answered today and they are comfortable with the above plan. Patient was discussed with Dr Rolle and he agrees with the above plan.      Should you have any questions or concerns after your visit today, please do not hesitate to call the office at [phone]. We strive to give you high-quality, patient-centered, collaborative care and I am honored to be a part of your health care team.      -------------------------------------------------------------------------------------------------  CHIEF COMPLAINT:  POV: LEFT RCR  DOS: 2/22/24    History of Present Illness:  MEHDI NICK is a pleasant 63 y.o. male returning to clinic for his first postoperative visit. He is 2 weeks S/P left shoulder arthroscopy, rotator cuff repair with decompression, debridement,  biceps tenodesis, done 02/22/24. Sutures removed today. Asim has been doing well since surgery. He is taking Tylenol to manage his shoulder soreness. Asim has been wearing his sling and performing postoperative exercises as instructed. He is struggling to sleep but otherwise, no issues or concerns to report. Denies redness or warmth at incision site. Denies any fever, chills, or paresthesia. They have been compliant with restrictions. Presents today wearing their shoulder sling. Doing well with daily home therapy for passive shoulder exercises and active elbow, wrist and hand exercises.       Review of Systems:   Review of systems for all 14 systems is negative for complaint except for the above noted findings in the history of present illness.    Family, social, and medical histories are obtained and reviewed.    Allergies:  No Known Allergies    Medications:    Current Outpatient Medications:     amphetamine-dextroamphetamine (Adderall) 20 mg tablet, Take 1 tablet (20 mg) by mouth 3 times a day., Disp: 90 tablet, Rfl: 0    amphetamine-dextroamphetamine (Adderall) 20 mg tablet, Take 1 tablet (20 mg) by mouth 3 times a day. Do not fill until 3/14/2024, Disp: 90 tablet, Rfl: 0    amphetamine-dextroamphetamine (Adderall) 20 mg tablet, Take 1 tablet (20 mg) by mouth 3 times a day. Do not fill until 4/13/2024, Disp: 90 tablet, Rfl: 0    atorvastatin (Lipitor) 40 mg tablet, Take 1 tablet (40 mg) by mouth once daily at bedtime., Disp: 90 tablet, Rfl: 3    multivit-min/ferrous fumarate (MULTI VITAMIN ORAL), Take by mouth., Disp: , Rfl:     omeprazole (PriLOSEC) 40 mg DR capsule, Take 1 capsule (40 mg) by mouth once daily in the morning. Take before meals for 3 days. Do not crush or chew., Disp: 3 capsule, Rfl: 0    Diagnoses/Problems:  Left shoulder rotator cuff tear    Physical Examination:  Examination of left shoulder reveals portal incisions are dry, intact and healing well. Minimal swelling. No warmth or erythema. No  ecchymosis. Sutures were removed today and steris strips placed on incision sites on top of shoulder. Neurovascularly intact distally. 5 out of 5 wrist, hand and thumb flexion and extension without pain. Full active range of motion of elbow    Results/Imaging:  I personally spent time viewing digital images today with the patient that were taken intraoperatively.     *While intending to generate a timely document that accurately reflects the content of the visit, no guarantee can be provided that every grammatical or spelling mistake has been or will be identified or corrected. Thank you for your understanding.

## 2024-03-13 ENCOUNTER — TELEPHONE (OUTPATIENT)
Dept: PHYSICAL THERAPY | Facility: CLINIC | Age: 64
End: 2024-03-13
Payer: COMMERCIAL

## 2024-03-13 NOTE — TELEPHONE ENCOUNTER
Attempted to return call to schedule, PT. Voicemail didn't roll over to allow for a message to be left.

## 2024-03-22 ENCOUNTER — EVALUATION (OUTPATIENT)
Dept: PHYSICAL THERAPY | Facility: CLINIC | Age: 64
End: 2024-03-22
Payer: COMMERCIAL

## 2024-03-22 DIAGNOSIS — M75.122 COMPLETE TEAR OF LEFT ROTATOR CUFF, UNSPECIFIED WHETHER TRAUMATIC: ICD-10-CM

## 2024-03-22 PROCEDURE — 97161 PT EVAL LOW COMPLEX 20 MIN: CPT | Mod: GP | Performed by: PHYSICAL THERAPIST

## 2024-03-22 PROCEDURE — 97140 MANUAL THERAPY 1/> REGIONS: CPT | Mod: GP | Performed by: PHYSICAL THERAPIST

## 2024-03-22 ASSESSMENT — ENCOUNTER SYMPTOMS
OCCASIONAL FEELINGS OF UNSTEADINESS: 0
LOSS OF SENSATION IN FEET: 0
DEPRESSION: 0

## 2024-03-22 ASSESSMENT — PATIENT HEALTH QUESTIONNAIRE - PHQ9: 1. LITTLE INTEREST OR PLEASURE IN DOING THINGS: NOT AT ALL

## 2024-03-22 ASSESSMENT — PAIN DESCRIPTION - DESCRIPTORS: DESCRIPTORS: DISCOMFORT;TIGHTNESS

## 2024-03-22 ASSESSMENT — PAIN - FUNCTIONAL ASSESSMENT: PAIN_FUNCTIONAL_ASSESSMENT: 0-10

## 2024-03-22 ASSESSMENT — COLUMBIA-SUICIDE SEVERITY RATING SCALE - C-SSRS: 1. IN THE PAST MONTH, HAVE YOU WISHED YOU WERE DEAD OR WISHED YOU COULD GO TO SLEEP AND NOT WAKE UP?: NO

## 2024-03-22 NOTE — PROGRESS NOTES
Physical Therapy Evaluation    Patient Name: Paramjit Jeffries  MRN: [id number]  Today's Date: 3/22/2024  Visit: 1/MN  Referred by: Dr. Paramjit Rolle  Time Calculation  Start Time: 1025  Stop Time: 1125  Time Calculation (min): 60 min  Diagnosis:   1. Complete tear of left rotator cuff, unspecified whether traumatic  Referral to Physical Therapy        PT Evaluation Time Entry  PT Evaluation (Low) Time Entry: 25  PT Therapeutic Procedures Time Entry  Manual Therapy Time Entry: 25                   PRECAUTIONS: No fall risk, L shoulder RCR post op precautison       SUBJECTIVE:     Pain: Patient reports 1 year history of L shoulder pain. Was going to chiropractor for dry needling and finally had an MRI that showed 40% tear of R shoulder and full tear of L shoulder. Underwent L shoulder RC repair, debridement and tenodesis on 02/22/24. Currently 4 weeks post op. Reports not much pain as he hasn't been using his LUE due to being immobilized.  Pain Assessment  Pain Assessment: 0-10  Pain Type: Surgical pain  Pain Location: Shoulder  Pain Orientation: Left, Outer  Pain Descriptors: Discomfort, Tightness  Clinical Progression: Gradually improving  Pain Interventions: Home medication (Ibuprofen)  Home Living: Independent with self care and ADL's prior; 2 story home lives with his spouse and reports no concerns     Prior level of function: Patient is owner of office supply company, sales. Enjoys home improvement and woodworking, golf, skiing. Currently off of all active use of LUE due to post op precautions       OBJECTIVE:    Shoulder PROM: (degrees) Left Right   Flexion 155 WFL   Abduction NT WFL   External Rotation 60 WFL   Internal Rotation NT WFL     *denotes pain with motion or muscle testing    Positive Special Tests: NT  Incisions are well healed and unremarkable  Outcome Measure:   54.55% quick DASH    TREATMENT:  - Therex:   Access Code: [email or web address]  URL: [email or web address]  Date:  "03/22/2024  Prepared by: Prerna Gomez    Exercises  - Circular Shoulder Pendulum with Table Support  - 2-3 x daily - 7 x weekly - 3 sets - 10 reps  - Supine Shoulder External Rotation with Dowel  - 2-3 x daily - 7 x weekly - 3 sets - 10 reps - 3 hold  - Seated Shoulder Flexion AAROM with Pulley Behind  - 2-3 x daily - 7 x weekly - 2 sets - 10 reps - 3 hold  - Seated Shoulder Flexion Towel Slide at Table Top  - 2 x daily - 7 x weekly - 2 sets - 10 reps - 3 hold    PROM L shoulder flexion and ER  Shoulder flexion pulleys x 2\"  AAROM for shoulder ER with wand 2 x 10 reps  Pendulum's CCW and CW x 1\" each    - Modalities:     Ice to L shoulder x 10\"    PATIENT EDUCATION:  Outpatient Education  Individual(s) Educated: Patient  Education Provided: Home Exercise Program  Risk and Benefits Discussed with Patient/Caregiver/Other: yes  Patient/Caregiver Demonstrated Understanding: yes  Plan of Care Discussed and Agreed Upon: yes  Patient Response to Education: Patient/Caregiver Verbalized Understanding of Information    ASSESSMENT:  Pt is a 62 yo male who presented to the clinic today for evaluation of his L shoulder post op RCR.  Examination reveals the following deficits:  L shoulder decreased strengthand decreased ROM, These deficits lead to difficulties with ADLs as well as recreational activity.  Skilled PT will address these deficits to help reduce pain for return to PLOF to be able to use his LUE for normal ADL's and recreation.  He will benefit from skilled PT to address the above mentioned impairments.      Low complexity due to patient's clinical presentation being stable and uncomplicated by any significant comorbidities that may affect rehab tolerance and progression.     Clinical presentation:  Stable and/or uncomplicated characteristics,     PLAN:   Treatment/Interventions: Cryotherapy, Education/ Instruction, Manual therapy, Neuromuscular re-education, Therapeutic exercises, Self care/ home management  PT Plan: " Skilled PT  PT Frequency: 1 time per week  Duration: 12 weeks  Onset Date: 02/22/24  Certification Period Start Date: 03/22/24  Certification Period End Date: 06/20/24  Rehab Potential: Good  Plan of Care Agreement: Patient    GOALS: Patient goal: return to unrestricted use of LUE for ADL's and recreation  Active       PT Problem       Patient will progress to active use of LUE for light ADL's       Start:  03/22/24    Expected End:  04/21/24            Patient will demonstrate full passive and act asst L shoulder flexion to 170 for ability to perform dressing and grooming ADLs       Start:  03/22/24    Expected End:  04/21/24            Patient will progress to resistive LUE use and exercise       Start:  03/22/24    Expected End:  05/21/24            Patient will report 85% subjective improvement and score <10% on Quick DASH functional tool       Start:  03/22/24    Expected End:  06/20/24

## 2024-03-28 ENCOUNTER — TREATMENT (OUTPATIENT)
Dept: PHYSICAL THERAPY | Facility: CLINIC | Age: 64
End: 2024-03-28
Payer: COMMERCIAL

## 2024-03-28 DIAGNOSIS — M75.122 COMPLETE TEAR OF LEFT ROTATOR CUFF, UNSPECIFIED WHETHER TRAUMATIC: Primary | ICD-10-CM

## 2024-03-28 PROCEDURE — 97110 THERAPEUTIC EXERCISES: CPT | Mod: GP | Performed by: PHYSICAL THERAPIST

## 2024-03-28 NOTE — PROGRESS NOTES
Physical Therapy Treatment    Patient Name: Paramjit Jeffries  MRN: [id number]  Today's Date: 3/28/2024   Visit 2/MN  Time Calculation  Start Time: 1355  Stop Time: 1425  Time Calculation (min): 30 min  1. Complete tear of left rotator cuff, unspecified whether traumatic  Follow Up In Physical Therapy           PT Therapeutic Procedures Time Entry  Therapeutic Exercise Time Entry: 25                   PRECAUTIONS: No fall risk, L shoulder RCR post op precaution     SUBJECTIVE:  Patient pleased with his progress. Has been working on his home exercises and sees improvement with his ability to lift his arm. Currently 5 weeks post op.  Pain level: 0/10  Compliant with HEP? Yes    OBJECTIVE:  PROM L shoulder ER 75 degrees (30 degrees abduction); L shoulder flexion 160; Apley reach to L4-5 LUE    TREATMENT:  - Therex:  Pendulum's x 30 reps CW/CCW  Wand shoulder ER 3 x 10 reps  PROM L shoulder flexion/ER  Shoulder flexion pulleys 2 minutes  Wall slides LUE shoulder flexion 2 x 10 reps  Shoulder pulleys IR x 20 reps    ASSESSMENT: Patient is ahead of schedule with his post op rehab showing nearly full PROM with minimal pain and progressing with light active use.     EDUCATION: Reiterated importance of wearing his sling when in community to avoid injury or overuse    PLAN: Patient will be gone for 2 weeks over the Easter holiday. Continue to progress active exercises on return.

## 2024-04-11 ENCOUNTER — TREATMENT (OUTPATIENT)
Dept: PHYSICAL THERAPY | Facility: CLINIC | Age: 64
End: 2024-04-11
Payer: COMMERCIAL

## 2024-04-11 DIAGNOSIS — M75.122 COMPLETE TEAR OF LEFT ROTATOR CUFF, UNSPECIFIED WHETHER TRAUMATIC: ICD-10-CM

## 2024-04-11 PROCEDURE — 97110 THERAPEUTIC EXERCISES: CPT | Mod: GP | Performed by: PHYSICAL THERAPIST

## 2024-04-11 NOTE — PROGRESS NOTES
Physical Therapy Treatment    Patient Name: Paramjit Jeffries  MRN: [id number]  Today's Date: 4/11/2024   Visit 3/MN  Referred by: Dr. Paramjit Rolle   DOS 2/22/24  Time Calculation  Start Time: 1400  Stop Time: 1434  Time Calculation (min): 34 min  1. Complete tear of left rotator cuff, unspecified whether traumatic  Follow Up In Physical Therapy           PT Therapeutic Procedures Time Entry  Therapeutic Exercise Time Entry: 34                   PRECAUTIONS: No fall risk, L shoulder RCR post op precautions     SUBJECTIVE:  Returns from trip to [place]. States he did his exercises and his shoulder is doing great. States he has mostly weaned off the tylenol and Advil. Currently 7 weeks post op  Pain level: 0/10 L shoulder, up to 2-3/10 with stress but short lived.  Compliant with HEP? Yes, obtained shoulder pulleys to use on his trip    OBJECTIVE:  AROM L shoulder flexion 155, Apley reach is T11    TREATMENT:  - Therex:  Wand shoulder ER 3 x 10 reps  PROM L shoulder flexion/ER  Shoulder flexion pulleys 2 minutes  Wall slides LUE shoulder flexion 3 x 10 reps  Scapular retraction 2 x 10 reps  Sidly R shoulder ER 3 x 10 reps    ASSESSMENT: patient remains ahead of schedule and continues to show improvement in his shoulder mobility and function.     EDUCATION: Advised to stay off of active abduction (reach to side or resistive use)    PLAN: Patient sees doctor next week for 8 week follow up. Await further orders to progress.

## 2024-04-18 ENCOUNTER — TREATMENT (OUTPATIENT)
Dept: PHYSICAL THERAPY | Facility: CLINIC | Age: 64
End: 2024-04-18
Payer: COMMERCIAL

## 2024-04-18 ENCOUNTER — OFFICE VISIT (OUTPATIENT)
Dept: ORTHOPEDIC SURGERY | Facility: HOSPITAL | Age: 64
End: 2024-04-18
Payer: COMMERCIAL

## 2024-04-18 VITALS — HEIGHT: 70 IN | WEIGHT: 193 LBS | BODY MASS INDEX: 27.63 KG/M2

## 2024-04-18 DIAGNOSIS — M75.122 COMPLETE TEAR OF LEFT ROTATOR CUFF, UNSPECIFIED WHETHER TRAUMATIC: ICD-10-CM

## 2024-04-18 DIAGNOSIS — Z98.890 S/P LEFT ROTATOR CUFF REPAIR: ICD-10-CM

## 2024-04-18 PROCEDURE — 99024 POSTOP FOLLOW-UP VISIT: CPT | Performed by: ORTHOPAEDIC SURGERY

## 2024-04-18 PROCEDURE — 1036F TOBACCO NON-USER: CPT | Performed by: ORTHOPAEDIC SURGERY

## 2024-04-18 PROCEDURE — 1125F AMNT PAIN NOTED PAIN PRSNT: CPT | Performed by: ORTHOPAEDIC SURGERY

## 2024-04-18 PROCEDURE — 97110 THERAPEUTIC EXERCISES: CPT | Mod: GP | Performed by: PHYSICAL THERAPIST

## 2024-04-18 PROCEDURE — 1160F RVW MEDS BY RX/DR IN RCRD: CPT | Performed by: ORTHOPAEDIC SURGERY

## 2024-04-18 PROCEDURE — 1159F MED LIST DOCD IN RCRD: CPT | Performed by: ORTHOPAEDIC SURGERY

## 2024-04-18 RX ORDER — DEXTROAMPHETAMINE SACCHARATE, AMPHETAMINE ASPARTATE MONOHYDRATE, DEXTROAMPHETAMINE SULFATE AND AMPHETAMINE SULFATE 1.25; 1.25; 1.25; 1.25 MG/1; MG/1; MG/1; MG/1
5 CAPSULE, EXTENDED RELEASE ORAL EVERY MORNING
COMMUNITY

## 2024-04-18 ASSESSMENT — PAIN - FUNCTIONAL ASSESSMENT: PAIN_FUNCTIONAL_ASSESSMENT: 0-10

## 2024-04-18 ASSESSMENT — PAIN SCALES - GENERAL: PAINLEVEL_OUTOF10: 3

## 2024-04-18 NOTE — PROGRESS NOTES
Subjective    Patient ID: Asim Jeffries is a 63 y.o. male.    Chief Complaint: No chief complaint on file.     Last Surgery: No surgery found  Last Surgery Date: No surgery found    [facility]  Paramjit is a 63 y.o. old male returning today for evaluation of his bilateral shoulders.     He reports that injections have been working. He is hoping for more today. He would also like to discuss surgery for later in the new year after he returns from vacation. He is currently using ibuprofen and CBD to help with inflammation.     04/18/24  Asim returns to the clinic for his second post operative visit. He is s/p  left arthroscopic decompression, debridement, rotator cuff repair and biceps tenodesis on 2/22/24.     He reports he is doing quite well overall. Pain is well controlled.     Objective   Ortho Exam  Patient is a well-developed, well-nourished male in no acute distress.  Breathes with normal chest rises.  Pupils are round and symmetric today.  Awake, alert, and oriented x3.      Examination of the right shoulder today reveals the skin to be intact. There is no sign of any atrophy, lesions, or abrasions. There is no pain to palpation of the bony prominences. Cervical lymphadenopathy examined, and this was negative. Patient had 5 out of 5 wrist flexion, extension, and thumb extension, bilaterally. Sensation was intact to light touch to median, ulnar, radial, axillary, and musculocutaneous nerves bilaterally. Positive radial pulse bilaterally. Provocative maneuvers on the right side today were negative.  Range of motion of the right shoulder revealed 0-170° of forward elevation, 0-60° of external rotation, and internal rotation up to T-12.     Examination of left shoulder reveals portal incisions are dry, intact and healing well. Minimal swelling. No warmth or erythema. No ecchymosis. Sutures were removed today and steris strips placed on incision sites on top of shoulder. Neurovascularly intact distally. 5 out of 5 wrist, hand  and thumb flexion and extension without pain. Full active range of motion of elbow. 0-150°  forward elevation. 0-70° of external rotation.            MRI reviewed by me personally showed full thickness tear of bilateral rotator cuff, L > R.      Assessment/Plan   Encounter Diagnoses:  Patient 2 months s/p  left arthroscopic decompression, debridement, rotator cuff repair and biceps tenodesis on 2/22/24.     He is doing well overall. He should continue to lift no more than 5lbs. He should also avoid any pushing, pulling or torquing motions. He may chip and put but should avoid swinging his golf clubs. Continue working on scapular stabilizers and working with PT on his range of motion and his therapy script was updated today. We will see him back in June. We will reassess his right shoulder at that time. There is a chance we will reorder his MRI with anticipation that he is going to have surgery in the fall. There is no rush to his surgery but we will still discuss it then.   No orders of the defined types were placed in this encounter.    Follow up in 2 months    Scribe Attestation  By signing my name below, Maria Guadalupe SMITH Scribe   attest that this documentation has been prepared under the direction and in the presence of Paramjit Rolle MD.

## 2024-04-18 NOTE — PROGRESS NOTES
Physical Therapy Treatment    Patient Name: Paramjit Jeffries  MRN: [id number]  Today's Date: 4/18/2024   Visit 4/MN  Time Calculation  Start Time: 1405  Stop Time: 1438  Time Calculation (min): 33 min  1. Complete tear of left rotator cuff, unspecified whether traumatic  Follow Up In Physical Therapy                               PRECAUTIONS: No fall risk, L shoulder RCR post op precautions     SUBJECTIVE:  Saw the doctor this morning states he is ahead of schedule at 8 weeks post op. Can progress from lifting cup of coffee to lifting up to 5 lbs. Told he can now sleep on his L shoulder.  Pain level: 0/10 L shoulder, up to 2-3/10 with stress   Compliant with HEP? Yes    OBJECTIVE:  Patient will nearly full PROM L shoulder with minimal pain at end range    TREATMENT:  - Therex:  PROM L shoulder flexion/ER  Shoulder flexion pulleys 2 minutes  Active shoulder flexion to 90 degrees, active shoulder abduction to 90 degrees  Wall slides LUE shoulder flexion 3 x 10 reps  Scapular retraction 2 x 10 reps  Shoulder rolls 2 x 10 reps  Sidly R shoulder ER 3 x 10 reps  Prone lie shoulder extension to neutral 2 x 10 reps  Rhythmic stab at 90 degrees flexion 2 x 1'    ASSESSMENT: progressed to active ROM within his pain parameters, fatigues at second set of active shoulder abduction     PLAN: continue weekly with active shoulder mobility.

## 2024-04-24 ENCOUNTER — OFFICE VISIT (OUTPATIENT)
Dept: PRIMARY CARE | Facility: CLINIC | Age: 64
End: 2024-04-24
Payer: COMMERCIAL

## 2024-04-24 ENCOUNTER — TREATMENT (OUTPATIENT)
Dept: PHYSICAL THERAPY | Facility: CLINIC | Age: 64
End: 2024-04-24
Payer: COMMERCIAL

## 2024-04-24 VITALS
OXYGEN SATURATION: 98 % | BODY MASS INDEX: 27.8 KG/M2 | DIASTOLIC BLOOD PRESSURE: 78 MMHG | HEART RATE: 80 BPM | WEIGHT: 191 LBS | SYSTOLIC BLOOD PRESSURE: 120 MMHG | TEMPERATURE: 98 F

## 2024-04-24 DIAGNOSIS — N30.01 ACUTE CYSTITIS WITH HEMATURIA: Primary | ICD-10-CM

## 2024-04-24 DIAGNOSIS — M75.122 COMPLETE TEAR OF LEFT ROTATOR CUFF, UNSPECIFIED WHETHER TRAUMATIC: ICD-10-CM

## 2024-04-24 DIAGNOSIS — R30.0 DYSURIA: ICD-10-CM

## 2024-04-24 LAB
POC APPEARANCE, URINE: CLEAR
POC BILIRUBIN, URINE: ABNORMAL
POC BLOOD, URINE: ABNORMAL
POC COLOR, URINE: ABNORMAL
POC GLUCOSE, URINE: NEGATIVE MG/DL
POC KETONES, URINE: NEGATIVE MG/DL
POC LEUKOCYTES, URINE: ABNORMAL
POC NITRITE,URINE: POSITIVE
POC PH, URINE: 5.5 PH
POC PROTEIN, URINE: ABNORMAL MG/DL
POC SPECIFIC GRAVITY, URINE: >=1.03
POC UROBILINOGEN, URINE: 1 EU/DL

## 2024-04-24 PROCEDURE — 97110 THERAPEUTIC EXERCISES: CPT | Mod: GP | Performed by: PHYSICAL THERAPIST

## 2024-04-24 PROCEDURE — 1036F TOBACCO NON-USER: CPT | Performed by: NURSE PRACTITIONER

## 2024-04-24 PROCEDURE — 87186 SC STD MICRODIL/AGAR DIL: CPT

## 2024-04-24 PROCEDURE — 81003 URINALYSIS AUTO W/O SCOPE: CPT | Performed by: NURSE PRACTITIONER

## 2024-04-24 PROCEDURE — 1160F RVW MEDS BY RX/DR IN RCRD: CPT | Performed by: NURSE PRACTITIONER

## 2024-04-24 PROCEDURE — 99213 OFFICE O/P EST LOW 20 MIN: CPT | Performed by: NURSE PRACTITIONER

## 2024-04-24 PROCEDURE — 87086 URINE CULTURE/COLONY COUNT: CPT

## 2024-04-24 PROCEDURE — 1159F MED LIST DOCD IN RCRD: CPT | Performed by: NURSE PRACTITIONER

## 2024-04-24 RX ORDER — SULFAMETHOXAZOLE AND TRIMETHOPRIM 800; 160 MG/1; MG/1
1 TABLET ORAL 2 TIMES DAILY
Qty: 14 TABLET | Refills: 0 | Status: SHIPPED | OUTPATIENT
Start: 2024-04-24 | End: 2024-05-01

## 2024-04-24 ASSESSMENT — ENCOUNTER SYMPTOMS
RESPIRATORY NEGATIVE: 1
GASTROINTESTINAL NEGATIVE: 1
CARDIOVASCULAR NEGATIVE: 1

## 2024-04-24 NOTE — PROGRESS NOTES
Subjective   Patient ID: Asim Jeffries is a 63 y.o. male who presents for Urinary Urgency (With scanty output, chills/fever, fatigue x 5 days).    HPI Presents today with 4-5 days of not feeling well.    Started with general body aches and frequent urges to urinate.   Had chills and slept for 24 hours.  Still had the urinary urgency and was urinating more frequently without much output  Felt good yesterday  Denies getting up to urinate on a usual basis.  Has noticed flow is down a bit and sometimes thinks he is done urinating and then will leak some.  Has learned just to sit on the toilet and make sure he is done.   PSA 1.37 on 2/13/2024  Review of Systems   Constitutional:         As noted in HPI     Respiratory: Negative.     Cardiovascular: Negative.    Gastrointestinal: Negative.    Genitourinary:         As noted in HPI         Objective   /78 (BP Location: Left arm, Patient Position: Sitting)   Pulse 80   Temp 36.7 °C (98 °F) (Temporal)   Wt 86.6 kg (191 lb)   SpO2 98%   BMI 27.80 kg/m²     Physical Exam  Constitutional:       Appearance: Normal appearance.   Cardiovascular:      Rate and Rhythm: Normal rate and regular rhythm.   Pulmonary:      Effort: Pulmonary effort is normal.      Breath sounds: Normal breath sounds.   Abdominal:      Tenderness: There is no right CVA tenderness or left CVA tenderness.   Musculoskeletal:         General: Normal range of motion.   Neurological:      General: No focal deficit present.      Mental Status: He is alert.   Psychiatric:         Mood and Affect: Mood normal.         Behavior: Behavior normal.         Assessment/Plan   Problem List Items Addressed This Visit    None  Visit Diagnoses         Codes    Acute cystitis with hematuria    -  Primary N30.01    Relevant Medications    sulfamethoxazole-trimethoprim (Bactrim DS) 800-160 mg tablet    Other Relevant Orders    Urine Culture    Dysuria     R30.0    Relevant Medications    sulfamethoxazole-trimethoprim  (Bactrim DS) 800-160 mg tablet    Other Relevant Orders    POCT UA Automated manually resulted (Completed)

## 2024-04-24 NOTE — PROGRESS NOTES
Physical Therapy Treatment    Patient Name: Paramjit Jeffries  MRN: [id number]  Today's Date: 4/24/2024   Visit 5/MN  Time Calculation  Start Time: 0928  Stop Time: 0955  Time Calculation (min): 27 min  1. Complete tear of left rotator cuff, unspecified whether traumatic  Follow Up In Physical Therapy           PT Therapeutic Procedures Time Entry  Therapeutic Exercise Time Entry: 23                   PRECAUTIONS: No fall risk, L shoulder RCR post op precautions     SUBJECTIVE:  Patient reports his shoulder continues to improve, sleeping with no problems at night.  Pain level: 0/10 in L shoulder at rest, up to 2-3/10 with active abduction  Compliant with HEP? Yes    OBJECTIVE:  AROM L shoulder flexion 165, L shoulder abd 175, Apley reach L to T12    TREATMENT:  - Therex:  PROM L shoulder flexion/ER  Shoulder flexion pulleys 2 minutes  UE ergometry L 2.5 x 4' (2' forward and 2' backward)  Active shoulder flexion to 90 degrees, active scaption to 90 degrees (2 sets of 10 reps)  Wall slides LUE shoulder flexion 3 x 10 reps  Scapular retraction 2 x 10 reps  Shoulder rolls 2 x 10 reps  R Sidly, L shoulder ER 2 x 10 reps  Prone lie shoulder extension to neutral 2 x 10 reps  Rhythmic stab at 90 degrees flexion 2 x 1'    ASSESSMENT: Patient tolerated treatment well and is progressing toward goals. Continued discomfort with reach to side using his LUE.     PLAN: Continue weekly with post op protocol for L shoulder RCR.

## 2024-04-24 NOTE — PATIENT INSTRUCTIONS
Increase fluids and rest.   Take the medication as directed.   Let me know in 48 hours if no better.

## 2024-04-27 LAB — BACTERIA UR CULT: ABNORMAL

## 2024-05-07 ENCOUNTER — APPOINTMENT (OUTPATIENT)
Dept: PHYSICAL THERAPY | Facility: CLINIC | Age: 64
End: 2024-05-07
Payer: COMMERCIAL

## 2024-05-09 ENCOUNTER — TREATMENT (OUTPATIENT)
Dept: PHYSICAL THERAPY | Facility: CLINIC | Age: 64
End: 2024-05-09
Payer: COMMERCIAL

## 2024-05-09 DIAGNOSIS — M75.122 COMPLETE TEAR OF LEFT ROTATOR CUFF, UNSPECIFIED WHETHER TRAUMATIC: ICD-10-CM

## 2024-05-09 PROCEDURE — 97110 THERAPEUTIC EXERCISES: CPT | Mod: GP | Performed by: PHYSICAL THERAPIST

## 2024-05-09 NOTE — PROGRESS NOTES
Physical Therapy Treatment    Patient Name: Paramjit Jeffries  MRN: [id number]  Today's Date: 5/9/2024   Visit 6/MN  Time Calculation  Start Time: 1710  Stop Time: 1745  Time Calculation (min): 35 min  1. Complete tear of left rotator cuff, unspecified whether traumatic  Follow Up In Physical Therapy           PT Therapeutic Procedures Time Entry  Therapeutic Exercise Time Entry: 30                   PRECAUTIONS: No fall risk, L shoulder RCR post op precautions     SUBJECTIVE:  Reports he is increasing the use of his L UE. Only has soreness if he forgets and overdoes it. Has been limiting himself to 5 lbs with LUE.  Pain level: 0/10  Compliant with HEP? Yes    OBJECTIVE:  AROM L shoulder flexion 158, L shoulder abduction 180    TREATMENT:  - Therex:  PROM L shoulder flexion/ER  Shoulder flexion pulleys 2 minutes  UE ergometry L 2.5 x 4' (2' forward and 2' backward)  Active shoulder flexion to 90 degrees, active scaption to 90 degrees (2 sets of 10 reps)  Wall slides LUE shoulder flexion 3 x 10 reps  Scapular retraction 2 x 10 reps  Shoulder rolls 2 x 10 reps w/ 1#'s  R Sidly, L shoulder ER 3 x 10 reps  Prone lie shoulder extension to neutral 3 x 10 reps  Rhythmic stab at 90 degrees flexion 2 x 1'    ASSESSMENT: Patient with good tolerance to treatment and progressing well with his care/on target with post op protocol.     PLAN: continue with active shoulder program, scap stab weekly. Begin cable rows with light tubing.

## 2024-05-15 ENCOUNTER — TREATMENT (OUTPATIENT)
Dept: PHYSICAL THERAPY | Facility: CLINIC | Age: 64
End: 2024-05-15
Payer: COMMERCIAL

## 2024-05-15 DIAGNOSIS — M75.122 COMPLETE TEAR OF LEFT ROTATOR CUFF, UNSPECIFIED WHETHER TRAUMATIC: ICD-10-CM

## 2024-05-15 PROCEDURE — 97110 THERAPEUTIC EXERCISES: CPT | Mod: GP | Performed by: PHYSICAL THERAPIST

## 2024-05-15 NOTE — PROGRESS NOTES
Physical Therapy Treatment    Patient Name: Paramjit Jeffries  MRN: [id number]  Today's Date: 5/15/2024   Visit 7/MN  Time Calculation  Start Time: 0915  Stop Time: 1005  Time Calculation (min): 50 min  1. Complete tear of left rotator cuff, unspecified whether traumatic  Follow Up In Physical Therapy           PT Therapeutic Procedures Time Entry  Therapeutic Exercise Time Entry: 40                   PRECAUTIONS: No fall risk, L shoulder RCR post op precautions     SUBJECTIVE:  No current pain but occasional dull ache it he overuses his L shoulder. States he mowed his lawn with self propelled mower on Monday for first time.  Pain level: 0-2/10  Compliant with HEP? Yes    OBJECTIVE:  MMT L shoulder is grossly 4+/5 all planes  Active L shoulder flexion is 170 degrees    TREATMENT:  - Therex:   PROM L shoulder flexion/ER  Shoulder flexion pulleys 2 minutes  UE ergometry L 2.5 x 4' (2' forward and 2' backward)  Active shoulder flexion to 90 degrees, active scaption to 90 degrees (2 sets of 10 reps)  Wall slides LUE shoulder flexion 3 x 10 reps  Scapular retraction with green Tband2 x 10 reps  Shoulder rolls 2 x 10 reps w/ 2#'s  R Sidly, L shoulder ER 3 x 10 reps  Prone lie shoulder extension to neutral 3 x 10 reps  Rhythmic stab at 90 degrees flexion 2 x 1'    ASSESSMENT: Patient continues to progress with his L shoulder function while noting minimal pain.     EDUCATION: Reinforced avoidance of active abduction and resistive activities of LUE and to protect surgical repair despite feeling better.    PLAN: Continue weekly with phase 2 active shoulder program post op L shoulder RC repair.

## 2024-05-22 ENCOUNTER — TREATMENT (OUTPATIENT)
Dept: PHYSICAL THERAPY | Facility: CLINIC | Age: 64
End: 2024-05-22
Payer: COMMERCIAL

## 2024-05-22 DIAGNOSIS — M75.122 COMPLETE TEAR OF LEFT ROTATOR CUFF, UNSPECIFIED WHETHER TRAUMATIC: ICD-10-CM

## 2024-05-22 PROCEDURE — 97110 THERAPEUTIC EXERCISES: CPT | Mod: GP | Performed by: PHYSICAL THERAPIST

## 2024-05-22 NOTE — PROGRESS NOTES
Physical Therapy Treatment    Patient Name: Paramjit Jeffries  MRN: [id number]  Today's Date: 5/22/2024   Visit 8/MN  Referred by: Dr. Paramjit Rolle   L shoulder RC repair, debridement and tenodesis on 02/22/24   Time Calculation  Start Time: 0915  Stop Time: 0942  Time Calculation (min): 27 min  1. Complete tear of left rotator cuff, unspecified whether traumatic  Follow Up In Physical Therapy           PT Therapeutic Procedures Time Entry  Therapeutic Exercise Time Entry: 25                   PRECAUTIONS: No fall risk, L shoulder RCR post op precautions     SUBJECTIVE:  Patient is currently 3 months post op. Notes occasional twinge in his L shoulder if he overdoes it. Otherwise continues to progress.  Pain level: 0/10 L shoulder  Compliant with HEP? Yes    OBJECTIVE:  AROM L shoulder 170 flexion, apley reach L to T 12  MMT L shoulder is grossly 4+/5 all planes     TREATMENT:  - Therex:  PROM L shoulder flexion/ER  UE ergometry L 2.5 x 4' (2' forward and 2' backward)  Active shoulder flexion to 90 degrees, active scaption to 90 degrees (2 sets of 10 reps)  Wall slides LUE shoulder flexion 3 x 10 reps  Scapular retraction with green Tband 2 x 10 reps  Shoulder rolls 2 x 10 reps w/ 2#'s  R Sidly, L shoulder ER 3 x 10 reps  Prone lie shoulder extension to neutral 3 x 10 reps  Rhythmic stab at 90 degrees flexion 2 x 1'    ASSESSMENT: Patient remains on target with his post op progression based on protocol parameters.     PLAN: Patient to continue with independent program the next few weeks since he is doing well and is compliant. He sees ortho surgeon on 06/18/24 with probable orders to begin strengthening, at which time we will reassess need for further visits.

## 2024-06-16 NOTE — PROGRESS NOTES
Subjective    Patient ID: Asim Jeffries is a 64 y.o. male.    Chief Complaint: No chief complaint on file.     Last Surgery: No surgery found  Last Surgery Date: No surgery found    [facility]ospit[facility] in [place]  Paramjit is a 63 y.o. old male returning today for evaluation of his bilateral shoulders.     He reports that injections have been working. He is hoping for more today. He would also like to discuss surgery for later in the new year after he returns from vacation. He is currently using ibuprofen and CBD to help with inflammation.     04/18/24  Asim returns to the clinic for his second post operative visit. He is s/p  left arthroscopic decompression, debridement, rotator cuff repair and biceps tenodesis on 2/22/24.     He reports he is doing quite well overall. Pain is well controlled.     06/18/24  Asim returns to the clinic today for a repeat follow up visit regarding his left shoulder.     He reports doing ok, might be closer to needing something done    Objective   Ortho Exam  Patient is a well-developed, well-nourished male in no acute distress.  Breathes with normal chest rises.  Pupils are round and symmetric today.  Awake, alert, and oriented x3.      Examination of the right shoulder today reveals the skin to be intact. There is no sign of any atrophy, lesions, or abrasions. There is no pain to palpation of the bony prominences. Cervical lymphadenopathy examined, and this was negative. Patient had 5 out of 5 wrist flexion, extension, and thumb extension, bilaterally. Sensation was intact to light touch to median, ulnar, radial, axillary, and musculocutaneous nerves bilaterally. Positive radial pulse bilaterally. Provocative maneuvers on the right side today were negative.  Range of motion of the right shoulder revealed 0-170° of forward elevation, 0-60° of external rotation, and internal rotation up to T-12.     Examination of left shoulder reveals portal incisions are dry, intact and healing well. Minimal swelling. No warmth or  erythema. No ecchymosis. Sutures were removed today and steris strips placed on incision sites on top of shoulder. Neurovascularly intact distally. 5 out of 5 wrist, hand and thumb flexion and extension without pain. Full active range of motion of elbow. 0-150°  forward elevation. 0-80° of external rotation. 5/5 RER. 5/5 strength           MRI reviewed by me personally showed full thickness tear of bilateral rotator cuff, L > R.      Assessment/Plan   Encounter Diagnoses:  Patient 5 months s/p  left arthroscopic decompression, debridement, rotator cuff repair and biceps tenodesis on 2/22/24.     He looks great today. I want him to continue his home exercises he was provided. He may begin progressive strengthening as tolerated. He has no limitations except to slowly ramp back up to his normal activities. We will see him back after Labor Day for a repeat clinical exam.     If he calls us regarding his right shoulder at any point this summer, we will  consider an injection.   No orders of the defined types were placed in this encounter.    Follow up in 3 months    Scribe Attestation  By signing my name below, I, Lea Kaufman   attest that this documentation has been prepared under the direction and in the presence of Paramjit Rolle MD.

## 2024-06-18 ENCOUNTER — OFFICE VISIT (OUTPATIENT)
Dept: ORTHOPEDIC SURGERY | Facility: HOSPITAL | Age: 64
End: 2024-06-18
Payer: COMMERCIAL

## 2024-06-18 DIAGNOSIS — M75.82 TENDINITIS OF BOTH ROTATOR CUFFS: ICD-10-CM

## 2024-06-18 DIAGNOSIS — M75.81 TENDINITIS OF BOTH ROTATOR CUFFS: ICD-10-CM

## 2024-06-18 DIAGNOSIS — M75.122 COMPLETE TEAR OF LEFT ROTATOR CUFF, UNSPECIFIED WHETHER TRAUMATIC: Primary | ICD-10-CM

## 2024-06-18 PROCEDURE — 99213 OFFICE O/P EST LOW 20 MIN: CPT | Performed by: ORTHOPAEDIC SURGERY

## 2024-06-19 ENCOUNTER — APPOINTMENT (OUTPATIENT)
Dept: PHYSICAL THERAPY | Facility: CLINIC | Age: 64
End: 2024-06-19
Payer: COMMERCIAL

## 2024-07-30 ENCOUNTER — DOCUMENTATION (OUTPATIENT)
Dept: PHYSICAL THERAPY | Facility: CLINIC | Age: 64
End: 2024-07-30
Payer: COMMERCIAL

## 2024-07-30 NOTE — PROGRESS NOTES
"Physical Therapy    Discharge Summary    Name: Paramjit Jeffries \"Asim\"  MRN: [id number]  : 1960  Date: 24    Discharge Summary: PT    Discharge Information: Date of discharge 24, Date of last visit 24, Date of evaluation 24, Number of attended visits 8, Referred by Dr. Paramjit Rolle, and Referred for s/p L shoulder RC repair and debridement L shoulder    Therapy Summary: Patient was seen for post op RC rehab for his L shoulder, phase 1-2    Discharge Status: Patient was last seen on 24 and was doing well at that time with post op rehab for his shoulder. He was continuing with his home exercises and was to follow up with his surgeon.     Rehab Discharge Reason: Achieved all and/or the most significant goals(s) and Other was to follow up with his surgeon      "

## 2024-08-13 ENCOUNTER — APPOINTMENT (OUTPATIENT)
Dept: PRIMARY CARE | Facility: CLINIC | Age: 64
End: 2024-08-13
Payer: COMMERCIAL

## 2024-08-13 VITALS
BODY MASS INDEX: 28.97 KG/M2 | HEART RATE: 70 BPM | DIASTOLIC BLOOD PRESSURE: 74 MMHG | OXYGEN SATURATION: 97 % | TEMPERATURE: 97.2 F | SYSTOLIC BLOOD PRESSURE: 120 MMHG | WEIGHT: 199 LBS

## 2024-08-13 DIAGNOSIS — F90.2 ATTENTION DEFICIT HYPERACTIVITY DISORDER (ADHD), COMBINED TYPE: ICD-10-CM

## 2024-08-13 DIAGNOSIS — F90.9 ATTENTION DEFICIT HYPERACTIVITY DISORDER (ADHD), UNSPECIFIED ADHD TYPE: ICD-10-CM

## 2024-08-13 LAB
AMPHETAMINES UR QL SCN: ABNORMAL
BARBITURATES UR QL SCN: ABNORMAL
BENZODIAZ UR QL SCN: ABNORMAL
BZE UR QL SCN: ABNORMAL
CANNABINOIDS UR QL SCN: ABNORMAL
FENTANYL+NORFENTANYL UR QL SCN: ABNORMAL
METHADONE UR QL SCN: ABNORMAL
OPIATES UR QL SCN: ABNORMAL
OXYCODONE+OXYMORPHONE UR QL SCN: ABNORMAL
PCP UR QL SCN: ABNORMAL

## 2024-08-13 PROCEDURE — 1036F TOBACCO NON-USER: CPT | Performed by: NURSE PRACTITIONER

## 2024-08-13 PROCEDURE — 80324 DRUG SCREEN AMPHETAMINES 1/2: CPT

## 2024-08-13 PROCEDURE — 1159F MED LIST DOCD IN RCRD: CPT | Performed by: NURSE PRACTITIONER

## 2024-08-13 PROCEDURE — 99213 OFFICE O/P EST LOW 20 MIN: CPT | Performed by: NURSE PRACTITIONER

## 2024-08-13 PROCEDURE — 1160F RVW MEDS BY RX/DR IN RCRD: CPT | Performed by: NURSE PRACTITIONER

## 2024-08-13 PROCEDURE — 1124F ACP DISCUSS-NO DSCNMKR DOCD: CPT | Performed by: NURSE PRACTITIONER

## 2024-08-13 PROCEDURE — 80307 DRUG TEST PRSMV CHEM ANLYZR: CPT

## 2024-08-13 RX ORDER — DEXTROAMPHETAMINE SACCHARATE, AMPHETAMINE ASPARTATE, DEXTROAMPHETAMINE SULFATE AND AMPHETAMINE SULFATE 5; 5; 5; 5 MG/1; MG/1; MG/1; MG/1
20 TABLET ORAL 2 TIMES DAILY
Qty: 60 TABLET | Refills: 0 | Status: SHIPPED | OUTPATIENT
Start: 2024-08-13 | End: 2024-09-12

## 2024-08-13 ASSESSMENT — ENCOUNTER SYMPTOMS
CARDIOVASCULAR NEGATIVE: 1
NEUROLOGICAL NEGATIVE: 1
PSYCHIATRIC NEGATIVE: 1
RESPIRATORY NEGATIVE: 1
MUSCULOSKELETAL NEGATIVE: 1
CONSTITUTIONAL NEGATIVE: 1

## 2024-08-13 NOTE — PROGRESS NOTES
OARRS:  No data recorded  I have personally reviewed the OARRS report for Paramjit Jeffries. I have considered the risks of abuse, dependence, addiction and diversion    Is the patient prescribed a combination of a benzodiazepine and opioid?  No    Last Urine Drug Screen / ordered today: Yes  Recent Results (from the past 8760 hour(s))   Drug Screen, Urine With Reflex to Confirmation    Collection Time: 08/29/23  1:07 PM   Result Value Ref Range    DRUG SCREEN COMMENT URINE SEE BELOW     Amphetamine Screen, Urine PRESUMPTIVE POSITIVE (A) NEGATIVE    Barbiturate Screen, Urine PRESUMPTIVE NEGATIVE NEGATIVE    BENZODIAZEPINE (PRESENCE) IN URINE BY SCREEN METHOD PRESUMPTIVE NEGATIVE NEGATIVE    Cannabinoid Screen, Urine PRESUMPTIVE NEGATIVE NEGATIVE    Cocaine Screen, Urine PRESUMPTIVE NEGATIVE NEGATIVE    Fentanyl, Ur PRESUMPTIVE NEGATIVE NEGATIVE    Opiate Screen, Urine PRESUMPTIVE NEGATIVE NEGATIVE    Oxycodone Screen, Ur PRESUMPTIVE NEGATIVE NEGATIVE    PCP Screen, Urine PRESUMPTIVE NEGATIVE NEGATIVE   Amphetamine Confirm, Urine    Collection Time: 08/29/23  1:07 PM   Result Value Ref Range    Methamphetamine Quant, Ur <200 ng/mL    MDA, Urine <200 ng/mL    MDEA, Urine <200 ng/mL    Phentermine,Urine <200 ng/mL    Amphetamines,Urine >5000 ng/mL    MDMA, Urine <200 ng/mL     Results are as expected.         Controlled Substance Agreement:  Date of the Last Agreement: 8/13/2024  Reviewed Controlled Substance Agreement including but not limited to the benefits, risks, and alternatives to treatment with a Controlled Substance medication(s).    Stimulants:   What is the patient's goal of therapy? Better function  Is this being achieved with current treatment? yes    Activities of Daily Living:   Is your overall impression that this patient is benefiting (symptom reduction outweighs side effects) from stimulant therapy? Yes     1. Physical Functioning: Better  2. Family Relationship: Better  3. Social Relationship: Better  4.  Mood: Better  5. Sleep Patterns: Same  6. Overall Function: Better  Subjective   Patient ID: Asim Jeffries is a 64 y.o. male who presents for Follow-up (ADHD/Patient has advanced directives in place).    HPI Presents today for follow up and medication renewal.  Tolerates medication well.  Take med 1-2 times a day and doing well    Review of Systems   Constitutional: Negative.    Respiratory: Negative.     Cardiovascular: Negative.    Musculoskeletal: Negative.    Neurological: Negative.    Psychiatric/Behavioral: Negative.         Objective   /74 (BP Location: Left arm, Patient Position: Sitting)   Pulse 70   Temp 36.2 °C (97.2 °F) (Temporal)   Wt 90.3 kg (199 lb)   SpO2 97%   BMI 28.97 kg/m²     Physical Exam  Constitutional:       Appearance: Normal appearance.   Cardiovascular:      Rate and Rhythm: Normal rate and regular rhythm.   Pulmonary:      Effort: Pulmonary effort is normal.      Breath sounds: Normal breath sounds.   Musculoskeletal:         General: Normal range of motion.   Neurological:      General: No focal deficit present.      Mental Status: He is alert.   Psychiatric:         Mood and Affect: Mood normal.         Behavior: Behavior normal.         Assessment/Plan   Problem List Items Addressed This Visit             ICD-10-CM    Attention deficit disorder (ADD) F98.8    Relevant Medications    amphetamine-dextroamphetamine (Adderall) 20 mg tablet    amphetamine-dextroamphetamine (Adderall) 20 mg tablet    amphetamine-dextroamphetamine (Adderall) 20 mg tablet

## 2024-08-13 NOTE — PATIENT INSTRUCTIONS
Medications renewed to twice a day.   Let me know in 3 months when due for refills.   Follow up in 6 months.

## 2024-08-16 LAB — HOLD SPECIMEN: NORMAL

## 2024-08-17 LAB
AMPHET UR-MCNC: 2603 NG/ML
MDA UR-MCNC: <200 NG/ML
MDEA UR-MCNC: <200 NG/ML
MDMA UR-MCNC: <200 NG/ML
METHAMPHET UR-MCNC: <200 NG/ML
PHENTERMINE UR CFM-MCNC: <200 NG/ML

## 2024-08-26 NOTE — PROGRESS NOTES
Subjective    Patient ID: Asim Jeffries is a 64 y.o. male.    Chief Complaint: No chief complaint on file.     Last Surgery: No surgery found  Last Surgery Date: No surgery found    BON Yusuf is a 63 y.o. old male returning today for evaluation of his bilateral shoulders.     He reports that injections have been working. He is hoping for more today. He would also like to discuss surgery for later in the new year after he returns from vacation. He is currently using ibuprofen and CBD to help with inflammation.     04/18/24  Asim returns to the clinic for his second post operative visit. He is s/p  left arthroscopic decompression, debridement, rotator cuff repair and biceps tenodesis on 2/22/24.     He reports he is doing quite well overall. Pain is well controlled.     06/18/24  Asim returns to the clinic today for a repeat follow up visit regarding his left shoulder.     He reports doing ok, might be closer to needing something done    8/27/24   Paramjit Jeffries is a 64 y.o. male returning to the clinic for a follow up visit regarding his left shoulder.    He reports he is doing fairly well today. He does have some mild pain but otherwise no new complaints.     Objective   Ortho Exam  Patient is a well-developed, well-nourished male in no acute distress.  Breathes with normal chest rises.  Pupils are round and symmetric today.  Awake, alert, and oriented x3.      Examination of the right shoulder today reveals the skin to be intact. There is no sign of any atrophy, lesions, or abrasions. There is no pain to palpation of the bony prominences. Cervical lymphadenopathy examined, and this was negative. Patient had 5 out of 5 wrist flexion, extension, and thumb extension, bilaterally. Sensation was intact to light touch to median, ulnar, radial, axillary, and musculocutaneous nerves bilaterally. Positive radial pulse bilaterally. Provocative maneuvers on the right side today were negative.  Range of motion of the right shoulder  revealed 0-170° of forward elevation, 0-60° of external rotation, and internal rotation up to T-12.     Examination of left shoulder reveals well healed incisions. Minimal swelling. No warmth or erythema. No ecchymosis. Sutures were removed today and steris strips placed on incision sites on top of shoulder. Neurovascularly intact distally. 5 out of 5 wrist, hand and thumb flexion and extension without pain. Full active range of motion of elbow. 0-150°  forward elevation. 0-80° of external rotation. 5/5 RER. 5/5 strength. Positive O'chris's.        MRI reviewed by me personally showed full thickness tear of bilateral rotator cuff, L > R.      Assessment/Plan   Encounter Diagnoses:  Patient 6 months s/p  left arthroscopic decompression, debridement, rotator cuff repair and biceps tenodesis on 2/22/24.     He continues to do well. He has some mild tenderness on exam. He will let us know how he is doing in the future.   No orders of the defined types were placed in this encounter.    Follow up as needed.     Scribe Attestation  By signing my name below, IMaria Guadalupe Scribe   attest that this documentation has been prepared under the direction and in the presence of Paramjit Rolle MD.

## 2024-08-27 ENCOUNTER — APPOINTMENT (OUTPATIENT)
Dept: ORTHOPEDIC SURGERY | Facility: HOSPITAL | Age: 64
End: 2024-08-27
Payer: COMMERCIAL

## 2024-08-27 ENCOUNTER — OFFICE VISIT (OUTPATIENT)
Dept: ORTHOPEDIC SURGERY | Facility: HOSPITAL | Age: 64
End: 2024-08-27
Payer: COMMERCIAL

## 2024-08-27 DIAGNOSIS — Z98.890 S/P LEFT ROTATOR CUFF REPAIR: Primary | ICD-10-CM

## 2024-08-27 PROCEDURE — 99212 OFFICE O/P EST SF 10 MIN: CPT | Performed by: ORTHOPAEDIC SURGERY

## 2024-12-16 ENCOUNTER — TELEPHONE (OUTPATIENT)
Dept: PRIMARY CARE | Facility: CLINIC | Age: 64
End: 2024-12-16
Payer: COMMERCIAL

## 2024-12-16 DIAGNOSIS — F90.9 ATTENTION DEFICIT HYPERACTIVITY DISORDER (ADHD), UNSPECIFIED ADHD TYPE: ICD-10-CM

## 2024-12-16 RX ORDER — DEXTROAMPHETAMINE SACCHARATE, AMPHETAMINE ASPARTATE, DEXTROAMPHETAMINE SULFATE AND AMPHETAMINE SULFATE 5; 5; 5; 5 MG/1; MG/1; MG/1; MG/1
20 TABLET ORAL 2 TIMES DAILY
Qty: 60 TABLET | Refills: 0 | Status: SHIPPED | OUTPATIENT
Start: 2024-12-16 | End: 2025-01-15

## 2024-12-16 NOTE — TELEPHONE ENCOUNTER
Patient requesting the next 3 months of his Adderrall to be sent to [facility]  Last OV 8/13/24  Please advise

## 2025-02-13 ENCOUNTER — APPOINTMENT (OUTPATIENT)
Dept: PRIMARY CARE | Facility: CLINIC | Age: 65
End: 2025-02-13
Payer: COMMERCIAL

## 2025-02-13 VITALS
TEMPERATURE: 97.3 F | SYSTOLIC BLOOD PRESSURE: 114 MMHG | DIASTOLIC BLOOD PRESSURE: 74 MMHG | WEIGHT: 201 LBS | BODY MASS INDEX: 29.26 KG/M2

## 2025-02-13 DIAGNOSIS — Z00.00 HEALTHCARE MAINTENANCE: Primary | ICD-10-CM

## 2025-02-13 DIAGNOSIS — F90.2 ATTENTION DEFICIT HYPERACTIVITY DISORDER (ADHD), COMBINED TYPE: ICD-10-CM

## 2025-02-13 DIAGNOSIS — E78.49 FAMILIAL COMBINED HYPERLIPIDEMIA: ICD-10-CM

## 2025-02-13 DIAGNOSIS — R73.01 ELEVATED FASTING BLOOD SUGAR: ICD-10-CM

## 2025-02-13 PROCEDURE — 99213 OFFICE O/P EST LOW 20 MIN: CPT | Performed by: NURSE PRACTITIONER

## 2025-02-13 PROCEDURE — 1159F MED LIST DOCD IN RCRD: CPT | Performed by: NURSE PRACTITIONER

## 2025-02-13 PROCEDURE — 1160F RVW MEDS BY RX/DR IN RCRD: CPT | Performed by: NURSE PRACTITIONER

## 2025-02-13 PROCEDURE — 1123F ACP DISCUSS/DSCN MKR DOCD: CPT | Performed by: NURSE PRACTITIONER

## 2025-02-13 PROCEDURE — 1158F ADVNC CARE PLAN TLK DOCD: CPT | Performed by: NURSE PRACTITIONER

## 2025-02-13 PROCEDURE — 99397 PER PM REEVAL EST PAT 65+ YR: CPT | Performed by: NURSE PRACTITIONER

## 2025-02-13 ASSESSMENT — PATIENT HEALTH QUESTIONNAIRE - PHQ9
2. FEELING DOWN, DEPRESSED OR HOPELESS: NOT AT ALL
SUM OF ALL RESPONSES TO PHQ9 QUESTIONS 1 AND 2: 0
1. LITTLE INTEREST OR PLEASURE IN DOING THINGS: NOT AT ALL

## 2025-02-13 NOTE — PROGRESS NOTES
Subjective   Patient ID: Asim Jeffries is a 64 y.o. male who presents for 6 mon fu (Pt is fasting. ).    HPI Presents today for complete physical and follow up on his medications   Overall health is very good.   Dental every 6 months.  Brushes and floss daily  Eye exams up to date.    Had cataracts removed and lens placed  Has progressive glasses  Skin checks annaully.  No skin cancer.   Non smoker, alcohol 2-3 per week, no drug use.   Caffeine 2 per day.  Well balanced diet.   Exercise joined [employer], [employer] and weight training 5 days a wek  Left shoulder rotator repair 2/22/20214  Blood work 2/2024 reviewed  Colonosopcy 29/2023 3 year recheck adenoma polyps  Immunization as noted.  Will get updated influenza vaccine now  Tolerates medication well.      Review of Systems   Constitutional: Negative.    HENT: Negative.     Eyes: Negative.    Respiratory: Negative.     Cardiovascular: Negative.    Gastrointestinal: Negative.    Endocrine: Negative.    Musculoskeletal: Negative.    Neurological: Negative.    Hematological: Negative.    Psychiatric/Behavioral: Negative.         Objective   /74 (BP Location: Left arm, Patient Position: Sitting)   Temp 36.3 °C (97.3 °F) (Temporal)   Wt 91.2 kg (201 lb)   BMI 29.26 kg/m²     Physical Exam  Constitutional:       Appearance: Normal appearance.   HENT:      Right Ear: Tympanic membrane, ear canal and external ear normal.      Left Ear: Tympanic membrane, ear canal and external ear normal.      Nose: Nose normal.      Mouth/Throat:      Mouth: Mucous membranes are moist.      Pharynx: Oropharynx is clear.   Eyes:      Pupils: Pupils are equal, round, and reactive to light.   Cardiovascular:      Rate and Rhythm: Normal rate and regular rhythm.      Pulses: Normal pulses.      Heart sounds: Normal heart sounds.   Pulmonary:      Effort: Pulmonary effort is normal.      Breath sounds: Normal breath sounds.   Abdominal:      General: Abdomen is flat. Bowel sounds are  normal.      Palpations: Abdomen is soft.   Musculoskeletal:         General: Normal range of motion.      Cervical back: Normal range of motion.   Lymphadenopathy:      Cervical: No cervical adenopathy.   Skin:     General: Skin is warm.   Neurological:      General: No focal deficit present.      Mental Status: He is alert and oriented to person, place, and time.   Psychiatric:         Mood and Affect: Mood normal.         Behavior: Behavior normal.         Assessment/Plan   Problem List Items Addressed This Visit             ICD-10-CM    Attention deficit disorder (ADD) F98.8    Elevated fasting blood sugar R73.01    Familial combined hyperlipidemia E78.49     Other Visit Diagnoses         Codes    Healthcare maintenance    -  Primary Z00.00    Relevant Orders    CBC and Auto Differential (Completed)    Comprehensive Metabolic Panel (Completed)    Hemoglobin A1C (Completed)    Lipid Panel (Completed)    TSH with reflex to Free T4 if abnormal (Completed)    Prostate Specific Antigen, Screen (Completed)          OARRS:  Letitia Brown, APRN-CNP on 2/14/2025 10:50 AM  I have personally reviewed the OARRS report for Paramjit Jeffries. I have considered the risks of abuse, dependence, addiction and diversion    Is the patient prescribed a combination of a benzodiazepine and opioid?  No    Last Urine Drug Screen / ordered today: No  Recent Results (from the past 8760 hours)   Amphetamine Confirm, Urine    Collection Time: 08/13/24  7:53 AM   Result Value Ref Range    Methamphetamine Quant, Ur <200 ng/mL    MDA, Urine <200 ng/mL    MDEA, Urine <200 ng/mL    Phentermine,Urine <200 ng/mL    Amphetamines,Urine 2603 ng/mL    MDMA, Urine <200 ng/mL   Drug Screen, Urine With Reflex to Confirmation    Collection Time: 08/13/24  7:53 AM   Result Value Ref Range    Amphetamine Screen, Urine Presumptive Positive (A) Presumptive Negative    Barbiturate Screen, Urine Presumptive Negative Presumptive Negative    Benzodiazepines Screen,  Urine Presumptive Negative Presumptive Negative    Cannabinoid Screen, Urine Presumptive Negative Presumptive Negative    Cocaine Metabolite Screen, Urine Presumptive Negative Presumptive Negative    Fentanyl Screen, Urine Presumptive Negative Presumptive Negative    Opiate Screen, Urine Presumptive Negative Presumptive Negative    Oxycodone Screen, Urine Presumptive Negative Presumptive Negative    PCP Screen, Urine Presumptive Negative Presumptive Negative    Methadone Screen, Urine Presumptive Negative Presumptive Negative     Results are as expected.         Controlled Substance Agreement:  Date of the Last Agreement: 8/13/2024  Reviewed Controlled Substance Agreement including but not limited to the benefits, risks, and alternatives to treatment with a Controlled Substance medication(s).    Stimulants:   What is the patient's goal of therapy? Better work focus  Is this being achieved with current treatment? Yes  Have decrease dose to bid and is working well for him    Activities of Daily Living:   Is your overall impression that this patient is benefiting (symptom reduction outweighs side effects) from stimulant therapy? Yes     1. Physical Functioning: Better  2. Family Relationship: Same  3. Social Relationship: Same  4. Mood: Better  5. Sleep Patterns: Same  6. Overall Function: Better

## 2025-02-14 DIAGNOSIS — I25.10 CORONARY ARTERIOSCLEROSIS: ICD-10-CM

## 2025-02-14 DIAGNOSIS — E78.49 FAMILIAL COMBINED HYPERLIPIDEMIA: ICD-10-CM

## 2025-02-14 DIAGNOSIS — F90.9 ATTENTION DEFICIT HYPERACTIVITY DISORDER (ADHD), UNSPECIFIED ADHD TYPE: ICD-10-CM

## 2025-02-14 LAB
ALBUMIN SERPL-MCNC: 4.3 G/DL (ref 3.6–5.1)
ALP SERPL-CCNC: 56 U/L (ref 35–144)
ALT SERPL-CCNC: 22 U/L (ref 9–46)
ANION GAP SERPL CALCULATED.4IONS-SCNC: 9 MMOL/L (CALC) (ref 7–17)
AST SERPL-CCNC: 16 U/L (ref 10–35)
BASOPHILS # BLD AUTO: 20 CELLS/UL (ref 0–200)
BASOPHILS NFR BLD AUTO: 0.3 %
BILIRUB SERPL-MCNC: 0.9 MG/DL (ref 0.2–1.2)
BUN SERPL-MCNC: 20 MG/DL (ref 7–25)
CALCIUM SERPL-MCNC: 9.2 MG/DL (ref 8.6–10.3)
CHLORIDE SERPL-SCNC: 105 MMOL/L (ref 98–110)
CHOLEST SERPL-MCNC: 214 MG/DL
CHOLEST/HDLC SERPL: 3.9 (CALC)
CO2 SERPL-SCNC: 27 MMOL/L (ref 20–32)
CREAT SERPL-MCNC: 0.83 MG/DL (ref 0.7–1.35)
EGFRCR SERPLBLD CKD-EPI 2021: 97 ML/MIN/1.73M2
EOSINOPHIL # BLD AUTO: 121 CELLS/UL (ref 15–500)
EOSINOPHIL NFR BLD AUTO: 1.8 %
ERYTHROCYTE [DISTWIDTH] IN BLOOD BY AUTOMATED COUNT: 13.1 % (ref 11–15)
EST. AVERAGE GLUCOSE BLD GHB EST-MCNC: 126 MG/DL
EST. AVERAGE GLUCOSE BLD GHB EST-SCNC: 7 MMOL/L
GLUCOSE SERPL-MCNC: 116 MG/DL (ref 65–99)
HBA1C MFR BLD: 6 % OF TOTAL HGB
HCT VFR BLD AUTO: 48 % (ref 38.5–50)
HDLC SERPL-MCNC: 55 MG/DL
HGB BLD-MCNC: 15.7 G/DL (ref 13.2–17.1)
LDLC SERPL CALC-MCNC: 134 MG/DL (CALC)
LYMPHOCYTES # BLD AUTO: 2231 CELLS/UL (ref 850–3900)
LYMPHOCYTES NFR BLD AUTO: 33.3 %
MCH RBC QN AUTO: 30.1 PG (ref 27–33)
MCHC RBC AUTO-ENTMCNC: 32.7 G/DL (ref 32–36)
MCV RBC AUTO: 92 FL (ref 80–100)
MONOCYTES # BLD AUTO: 637 CELLS/UL (ref 200–950)
MONOCYTES NFR BLD AUTO: 9.5 %
NEUTROPHILS # BLD AUTO: 3692 CELLS/UL (ref 1500–7800)
NEUTROPHILS NFR BLD AUTO: 55.1 %
NONHDLC SERPL-MCNC: 159 MG/DL (CALC)
PLATELET # BLD AUTO: 276 THOUSAND/UL (ref 140–400)
PMV BLD REES-ECKER: 10.2 FL (ref 7.5–12.5)
POTASSIUM SERPL-SCNC: 4.4 MMOL/L (ref 3.5–5.3)
PROT SERPL-MCNC: 6.6 G/DL (ref 6.1–8.1)
PSA SERPL-MCNC: 1.09 NG/ML
RBC # BLD AUTO: 5.22 MILLION/UL (ref 4.2–5.8)
SODIUM SERPL-SCNC: 141 MMOL/L (ref 135–146)
TRIGL SERPL-MCNC: 140 MG/DL
TSH SERPL-ACNC: 1.4 MIU/L (ref 0.4–4.5)
WBC # BLD AUTO: 6.7 THOUSAND/UL (ref 3.8–10.8)

## 2025-02-14 RX ORDER — DEXTROAMPHETAMINE SACCHARATE, AMPHETAMINE ASPARTATE, DEXTROAMPHETAMINE SULFATE AND AMPHETAMINE SULFATE 5; 5; 5; 5 MG/1; MG/1; MG/1; MG/1
20 TABLET ORAL 2 TIMES DAILY
Qty: 60 TABLET | Refills: 0 | Status: SHIPPED | OUTPATIENT
Start: 2025-02-14 | End: 2025-03-16

## 2025-02-14 RX ORDER — ATORVASTATIN CALCIUM 40 MG/1
40 TABLET, FILM COATED ORAL NIGHTLY
Qty: 90 TABLET | Refills: 3 | Status: SHIPPED | OUTPATIENT
Start: 2025-02-14

## 2025-02-14 ASSESSMENT — ENCOUNTER SYMPTOMS
HEMATOLOGIC/LYMPHATIC NEGATIVE: 1
EYES NEGATIVE: 1
CONSTITUTIONAL NEGATIVE: 1
MUSCULOSKELETAL NEGATIVE: 1
GASTROINTESTINAL NEGATIVE: 1
CARDIOVASCULAR NEGATIVE: 1
RESPIRATORY NEGATIVE: 1
ENDOCRINE NEGATIVE: 1
PSYCHIATRIC NEGATIVE: 1
NEUROLOGICAL NEGATIVE: 1

## 2025-08-22 ENCOUNTER — APPOINTMENT (OUTPATIENT)
Dept: PRIMARY CARE | Facility: CLINIC | Age: 65
End: 2025-08-22
Payer: COMMERCIAL

## 2025-08-22 ASSESSMENT — PATIENT HEALTH QUESTIONNAIRE - PHQ9
2. FEELING DOWN, DEPRESSED OR HOPELESS: NOT AT ALL
1. LITTLE INTEREST OR PLEASURE IN DOING THINGS: NOT AT ALL
SUM OF ALL RESPONSES TO PHQ9 QUESTIONS 1 AND 2: 0

## 2025-09-01 ASSESSMENT — ENCOUNTER SYMPTOMS
PALPITATIONS: 0
ACTIVITY CHANGE: 1
POLYDIPSIA: 0
DECREASED CONCENTRATION: 1
POLYPHAGIA: 0

## 2026-02-10 ENCOUNTER — APPOINTMENT (OUTPATIENT)
Dept: PRIMARY CARE | Facility: CLINIC | Age: 66
End: 2026-02-10
Payer: COMMERCIAL

## (undated) DEVICE — STRIP, SKIN CLOSURE, STERI STRIP, REINFORCED, 0.5 X 4 IN

## (undated) DEVICE — SPONGE, GAUZE, AVANT, STERILE, NONWOVEN, 4PLY, 4 X 4, STANDARD

## (undated) DEVICE — SUTURE, PDS II, 0 36 IN, CT-1, VIOLET

## (undated) DEVICE — Device

## (undated) DEVICE — SUTURE, ETHILON, 3-0, 18 IN, PS1, BLACK

## (undated) DEVICE — GLOVE, SURGICAL, PROTEXIS PI ORTHO, 8.5, PF, LF

## (undated) DEVICE — DRAPE, SHEET, U, STERI DRAPE, 47 X 51 IN, DISPOSABLE, STERILE

## (undated) DEVICE — CANNULA, FLEXIBLE 6.5 X 72 THREAD CLEAR TRAC

## (undated) DEVICE — SUTURE, VICRYL PLUS, 2-0, SH, 1X27IN, UNDYED

## (undated) DEVICE — BLADE, SHAVER 4.5 INCISOR + PLATINUM

## (undated) DEVICE — SUTURE TAPE, 1.3MM 40IN, BLK/WH, W/TAPER NDL 36.6MM, DARK BLUE, NONABSORB

## (undated) DEVICE — DRESSING, GAUZE, PETROLATUM, PATCH, XEROFORM, 1 X 8 IN, STERILE

## (undated) DEVICE — SUTURE, ULTRABRAID 2 BLUE

## (undated) DEVICE — HD SCORPION NEEDLE, W2ITH MEGA LOADER

## (undated) DEVICE — DRESSING, ABDOMINAL, TENDERSORB, 8 X 7-1/2 IN, STERILE

## (undated) DEVICE — KIT, DISPOSABLE, FOR 2.8 Q-FIX SHOULDER

## (undated) DEVICE — TUBING, NFLOW, FMS VUE, SNGL USE, DISP, LF

## (undated) DEVICE — TUBING, OUTFLOW, DRAIN PIPE, W/ONE WAY VALVE (FMS VUE)

## (undated) DEVICE — MASK, FACE, TENET, FOAM POSITIONING, DISPOSABLE

## (undated) DEVICE — DRESSING, GAUZE, SPONGE, VERSALON, 4 PLY, 2 X 2 IN, STERILE

## (undated) DEVICE — WAND, COBLATION, WEREWOLF FLOW 90

## (undated) DEVICE — MEDLINE SUTURE RETRIEVER, STERILE

## (undated) DEVICE — BLANKET, LOWER BODY, VHA PLUS, ADULT

## (undated) DEVICE — ELECTRODE, ELECTROSURGICAL, BLADE, INSULATED, ENT/IMA, STERILE

## (undated) DEVICE — GOWN, ASTOUND, XL

## (undated) DEVICE — KIT, BEACH CHAIR TRIMANO

## (undated) DEVICE — DRESSING, TRANSPARENT, TEGADERM, FRAME STYLE, 4 X 4.5, STRL

## (undated) DEVICE — CANNULA, CLEAR TRAC 8.0 X 72

## (undated) DEVICE — SOLUTION, INJECTION, SODIUM CHLORIDE 9%, 3000ML

## (undated) DEVICE — GLOVE, SURGICAL, PROTEXIS PI , 8.5, PF, LF

## (undated) DEVICE — DRAPE, SHEET, 17 X 23 IN